# Patient Record
Sex: FEMALE | Race: BLACK OR AFRICAN AMERICAN | Employment: PART TIME | URBAN - METROPOLITAN AREA
[De-identification: names, ages, dates, MRNs, and addresses within clinical notes are randomized per-mention and may not be internally consistent; named-entity substitution may affect disease eponyms.]

---

## 2017-01-02 ENCOUNTER — HOSPITAL ENCOUNTER (EMERGENCY)
Age: 52
Discharge: HOME | End: 2017-01-02
Attending: EMERGENCY MEDICINE

## 2017-01-02 VITALS
OXYGEN SATURATION: 95 % | BODY MASS INDEX: 30.21 KG/M2 | HEIGHT: 61 IN | RESPIRATION RATE: 22 BRPM | HEART RATE: 110 BPM | TEMPERATURE: 99 F | SYSTOLIC BLOOD PRESSURE: 174 MMHG | WEIGHT: 160 LBS | DIASTOLIC BLOOD PRESSURE: 103 MMHG

## 2017-01-02 DIAGNOSIS — J20.9 ACUTE BRONCHITIS, UNSPECIFIED ORGANISM: Primary | ICD-10-CM

## 2017-01-02 PROCEDURE — 99283 EMERGENCY DEPT VISIT LOW MDM: CPT

## 2017-01-02 RX ORDER — AZITHROMYCIN 250 MG/1
TABLET, FILM COATED ORAL
Qty: 1 PACKAGE | Refills: 0 | Status: SHIPPED | OUTPATIENT
Start: 2017-01-02 | End: 2017-01-07

## 2017-01-02 RX ORDER — PREDNISONE 20 MG/1
40 TABLET ORAL DAILY
Qty: 10 TABLET | Refills: 0 | Status: SHIPPED | OUTPATIENT
Start: 2017-01-02 | End: 2017-01-07

## 2017-01-02 NOTE — ED PROVIDER NOTES
Patient Seen in: [address] [facility] In [place]    History   Patient presents with:  Cough/URI    Stated Complaint: cough/congestion    HPI    Patient presents with cough and congestion.   The patient states that the symptoms worsened on Saturd Surgeon: Shilo Vaughn MD;  [facility] [address] [facility] NDL/CATH SPI DX/THER Roxie Weztel  11/11/2015    Comment Procedure: ;  Surgeon: Shilo Vaughn MD;  Location: [address] [place] [address] BY [facility] [address] [facility] 59  N are as noted in HPI. Constitutional and vital signs reviewed. All other systems reviewed and negative except as noted above. PSFH elements reviewed from today and agreed except as otherwise stated in HPI.     Physical Exam     ED Triage Vitals   BP R-0

## 2017-01-03 ENCOUNTER — TELEPHONE (OUTPATIENT)
Dept: FAMILY MEDICINE CLINIC | Facility: CLINIC | Age: 52
End: 2017-01-03

## 2017-01-23 PROCEDURE — 86160 COMPLEMENT ANTIGEN: CPT | Performed by: INTERNAL MEDICINE

## 2017-02-14 ENCOUNTER — OFFICE VISIT (OUTPATIENT)
Dept: FAMILY MEDICINE CLINIC | Facility: CLINIC | Age: 52
End: 2017-02-14

## 2017-02-14 VITALS
HEIGHT: 61 IN | HEART RATE: 85 BPM | BODY MASS INDEX: 31.53 KG/M2 | OXYGEN SATURATION: 97 % | RESPIRATION RATE: 18 BRPM | WEIGHT: 167 LBS | TEMPERATURE: 99 F | DIASTOLIC BLOOD PRESSURE: 84 MMHG | SYSTOLIC BLOOD PRESSURE: 138 MMHG

## 2017-02-14 DIAGNOSIS — Z13.820 SCREENING FOR OSTEOPOROSIS: ICD-10-CM

## 2017-02-14 DIAGNOSIS — Z00.00 ROUTINE GENERAL MEDICAL EXAMINATION AT A HEALTH CARE FACILITY: Primary | ICD-10-CM

## 2017-02-14 DIAGNOSIS — Z00.00 BLOOD TESTS FOR ROUTINE GENERAL PHYSICAL EXAMINATION: ICD-10-CM

## 2017-02-14 DIAGNOSIS — I10 ESSENTIAL HYPERTENSION, BENIGN: ICD-10-CM

## 2017-02-14 DIAGNOSIS — L30.9 DERMATITIS: ICD-10-CM

## 2017-02-14 DIAGNOSIS — E55.9 VITAMIN D DEFICIENCY: ICD-10-CM

## 2017-02-14 DIAGNOSIS — Z12.39 SCREENING FOR BREAST CANCER: ICD-10-CM

## 2017-02-14 DIAGNOSIS — M32.9 SYSTEMIC LUPUS ERYTHEMATOSUS, UNSPECIFIED SLE TYPE, UNSPECIFIED ORGAN INVOLVEMENT STATUS (HCC): ICD-10-CM

## 2017-02-14 DIAGNOSIS — Z78.0 POSTMENOPAUSAL: ICD-10-CM

## 2017-02-14 PROCEDURE — 99396 PREV VISIT EST AGE 40-64: CPT | Performed by: FAMILY MEDICINE

## 2017-02-14 RX ORDER — CLOBETASOL PROPIONATE 0.46 MG/ML
5 SOLUTION TOPICAL 2 TIMES DAILY
Qty: 50 ML | Refills: 1 | Status: SHIPPED | OUTPATIENT
Start: 2017-02-14 | End: 2017-10-25

## 2017-02-14 RX ORDER — KETOCONAZOLE 20 MG/ML
5 SHAMPOO TOPICAL
Qty: 120 ML | Refills: 3 | Status: SHIPPED | OUTPATIENT
Start: 2017-02-16 | End: 2019-01-28

## 2017-02-14 NOTE — PROGRESS NOTES
HPI:  Here for a physical.    PAST MEDICAL HISTORY:  Past Medical History   Diagnosis Date   • ANEMIA    • HEADACHES    • HYPERTENSION    • OTHER DISEASES      lupus   • Brachial neuritis or radiculitis NOS    • Other malaise and fatigue    • Migraine, uns Outpatient Prescriptions:  [START ON 2/16/2017] Ketoconazole 2 % External Shampoo Apply 5 mL topically twice a week. Disp: 120 mL Rfl: 3   Clobetasol Propionate 0.05 % External Solution Apply 5 mL topically 2 (two) times daily.  Disp: 50 mL Rfl: 1   Labetal History Narrative       REVIEW OF SYSTEMS:  GENERAL: Feeling generally achy. Has pain behind the right shoulder radiating into the arm. Has pain in the right hip radiating down the leg. Will be seeing a neurologist on the 27th.   This is recommended by SHANTELL inflammation. Bilateral tympanic membranes pearly white. No effusions noted. Hearing grossly normal.  EYES: PERRLA, EOMI, bilateral sclera anicteric, non-injected. Conjunctiva pink. NOSE: Mucosa appears healthy. OROPHARYNX: Mucosa moist without lesions. monitor. Lupus followed by rheumatology.   Scalp dermatitis: Refilled shampoo  Eczema of the hands, refilled cream      Patient understood above plan and agreed to do labs within the next 30 days as well as to make all appointments for referrals if given w

## 2017-02-14 NOTE — PATIENT INSTRUCTIONS
Fast 8 hours for labs. Water, black coffee, or plain tea only. Any sugar in the system will alter the glucose level and triglycerides.

## 2017-02-27 PROBLEM — M48.061 LUMBAR SPINAL STENOSIS: Status: ACTIVE | Noted: 2017-02-27

## 2017-03-11 LAB
CHOL/HDLC RATIO: 2.5 (CALC)
CHOLESTEROL, TOTAL: 204 MG/DL (ref 125–200)
HDL CHOLESTEROL: 82 MG/DL
LDL-CHOLESTEROL: 100 MG/DL (CALC)
NON-HDL CHOLESTEROL: 122 MG/DL (CALC)
TRIGLYCERIDES: 108 MG/DL
VITAMIN D, 25-OH, TOTAL: 19 NG/ML (ref 30–100)

## 2017-03-12 RX ORDER — ERGOCALCIFEROL 1.25 MG/1
50000 CAPSULE ORAL
Qty: 12 CAPSULE | Refills: 0 | Status: SHIPPED | OUTPATIENT
Start: 2017-03-12 | End: 2017-03-16 | Stop reason: ALTCHOICE

## 2017-03-16 ENCOUNTER — OFFICE VISIT (OUTPATIENT)
Dept: FAMILY MEDICINE CLINIC | Facility: CLINIC | Age: 52
End: 2017-03-16

## 2017-03-16 VITALS
TEMPERATURE: 98 F | SYSTOLIC BLOOD PRESSURE: 124 MMHG | HEART RATE: 95 BPM | RESPIRATION RATE: 18 BRPM | OXYGEN SATURATION: 97 % | BODY MASS INDEX: 32 KG/M2 | DIASTOLIC BLOOD PRESSURE: 86 MMHG | WEIGHT: 168.81 LBS

## 2017-03-16 DIAGNOSIS — K59.00 CONSTIPATION, UNSPECIFIED CONSTIPATION TYPE: ICD-10-CM

## 2017-03-16 DIAGNOSIS — R10.84 DIFFUSE ABDOMINAL PAIN: Primary | ICD-10-CM

## 2017-03-16 DIAGNOSIS — R19.8 CHANGE IN BOWEL MOVEMENT: ICD-10-CM

## 2017-03-16 DIAGNOSIS — K92.1 MELENA: ICD-10-CM

## 2017-03-16 PROCEDURE — 99213 OFFICE O/P EST LOW 20 MIN: CPT | Performed by: FAMILY MEDICINE

## 2017-03-16 RX ORDER — POLYETHYLENE GLYCOL 3350 17 G/17G
17 POWDER, FOR SOLUTION ORAL DAILY
Qty: 10 EACH | Refills: 0 | COMMUNITY
Start: 2017-03-16 | End: 2020-10-29 | Stop reason: ALTCHOICE

## 2017-03-16 NOTE — PROGRESS NOTES
Here with 1 week of decreased bowel movements. The bowel movements have gone from being her normal shape to more small balls. She is noticed blood in the stool. No blood when wiping. She has abdominal pain worse in the epigastrium.   She is on a new med INJECTION, W/WO CONTRAST, DX/THERAPEUTIC SUBSTANCE, EPIDURAL/SUBARACHNOID; CERVICAL/THORACIC N/A 11/11/2015    Comment Procedure: CERVICAL EPIDURAL;  Surgeon: Litzy Soto MD;  Location: [address] [place] [address] NDL/CATH SPI Temp(Src) 98.3 °F (36.8 °C) (Oral)  Resp 18  Wt 168 lb 12.8 oz  SpO2 97%    Alert no acute distress. Back no CVA tenderness. Abdomen diffusely tender no rebound or guarding. Unable to palpate for masses. No inguinal lymphadenopathy.   Visual inspection

## 2017-03-23 ENCOUNTER — TELEPHONE (OUTPATIENT)
Dept: FAMILY MEDICINE CLINIC | Facility: CLINIC | Age: 52
End: 2017-03-23

## 2017-03-23 NOTE — TELEPHONE ENCOUNTER
Radiology asking if you would like IV contrast. Pt scheduled for CT abd pelvis tomorrow.   usually done with iv contrast

## 2017-04-20 RX ORDER — MOMETASONE FUROATE 1 MG/ML
SOLUTION TOPICAL
Qty: 60 ML | Refills: 1 | Status: SHIPPED | OUTPATIENT
Start: 2017-04-20 | End: 2020-04-27

## 2017-04-24 RX ORDER — ONDANSETRON 8 MG/1
TABLET, ORALLY DISINTEGRATING ORAL
Qty: 15 TABLET | Refills: 0 | OUTPATIENT
Start: 2017-04-24

## 2017-04-26 RX ORDER — ONDANSETRON 8 MG/1
TABLET, ORALLY DISINTEGRATING ORAL
Qty: 15 TABLET | Refills: 0 | Status: SHIPPED | OUTPATIENT
Start: 2017-04-26 | End: 2017-05-31

## 2017-04-27 RX ORDER — ONDANSETRON 8 MG/1
TABLET, ORALLY DISINTEGRATING ORAL
Qty: 15 TABLET | Refills: 0 | OUTPATIENT
Start: 2017-04-27

## 2017-05-03 ENCOUNTER — HOSPITAL (OUTPATIENT)
Dept: OTHER | Age: 52
End: 2017-05-03
Attending: ORTHOPAEDIC SURGERY

## 2017-05-03 LAB
ANALYZER ANC (IANC): ABNORMAL
ERYTHROCYTE [DISTWIDTH] IN BLOOD: 13.9 % (ref 11–15)
HEMATOCRIT: 35.4 % (ref 36–46.5)
HGB BLD-MCNC: 11.2 GM/DL (ref 12–15.5)
INR PPP: 1.1
MCH RBC QN AUTO: 23.1 PG (ref 26–34)
MCHC RBC AUTO-ENTMCNC: 31.6 GM/DL (ref 32–36.5)
MCV RBC AUTO: 73 FL (ref 78–100)
PLATELET # BLD: 255 THOUSAND/MCL (ref 140–450)
PROTHROMBIN TIME: 12 SECONDS (ref 9.7–11.8)
PROTHROMBIN TIME: ABNORMAL
RBC # BLD: 4.85 MILLION/MCL (ref 4–5.2)
WBC # BLD: 5.1 THOUSAND/MCL (ref 4.2–11)

## 2017-05-10 ENCOUNTER — TELEPHONE (OUTPATIENT)
Dept: FAMILY MEDICINE CLINIC | Facility: CLINIC | Age: 52
End: 2017-05-10

## 2017-05-12 ENCOUNTER — MED REC SCAN ONLY (OUTPATIENT)
Dept: FAMILY MEDICINE CLINIC | Facility: CLINIC | Age: 52
End: 2017-05-12

## 2017-05-31 PROCEDURE — 86038 ANTINUCLEAR ANTIBODIES: CPT | Performed by: INTERNAL MEDICINE

## 2017-05-31 PROCEDURE — 86160 COMPLEMENT ANTIGEN: CPT | Performed by: INTERNAL MEDICINE

## 2017-06-14 ENCOUNTER — PATIENT MESSAGE (OUTPATIENT)
Dept: FAMILY MEDICINE CLINIC | Facility: CLINIC | Age: 52
End: 2017-06-14

## 2017-06-15 RX ORDER — BETAMETHASONE DIPROPIONATE 0.5 MG/G
1 CREAM TOPICAL 2 TIMES DAILY
Qty: 1 TUBE | Refills: 0 | Status: SHIPPED | OUTPATIENT
Start: 2017-06-15 | End: 2021-06-17 | Stop reason: ALTCHOICE

## 2017-06-15 NOTE — TELEPHONE ENCOUNTER
Betamethasone DO 0.05% cream   Please see messages below, okay to refill this medication or would you like pt to go back to Derm?

## 2017-06-15 NOTE — TELEPHONE ENCOUNTER
From: Estiven Avelar  To: Joshua Ennis MD  Sent: 6/14/2017 10:25 AM CDT  Subject: Prescription Question    Dr. Whitlock,    I have used Betamethasone DO 0.05% cream prescribed by Dr. Astrid Goldstein.  I use it sparingly (prescribed in 2015) and have deplete

## 2017-09-01 ENCOUNTER — OFFICE VISIT (OUTPATIENT)
Dept: FAMILY MEDICINE CLINIC | Facility: CLINIC | Age: 52
End: 2017-09-01

## 2017-09-01 VITALS
OXYGEN SATURATION: 98 % | TEMPERATURE: 98 F | WEIGHT: 166 LBS | BODY MASS INDEX: 31.34 KG/M2 | HEART RATE: 99 BPM | DIASTOLIC BLOOD PRESSURE: 100 MMHG | RESPIRATION RATE: 18 BRPM | HEIGHT: 61 IN | SYSTOLIC BLOOD PRESSURE: 160 MMHG

## 2017-09-01 DIAGNOSIS — I10 ESSENTIAL HYPERTENSION, BENIGN: Primary | ICD-10-CM

## 2017-09-01 DIAGNOSIS — M32.9 SYSTEMIC LUPUS ERYTHEMATOSUS, UNSPECIFIED SLE TYPE, UNSPECIFIED ORGAN INVOLVEMENT STATUS (HCC): ICD-10-CM

## 2017-09-01 PROCEDURE — 99213 OFFICE O/P EST LOW 20 MIN: CPT | Performed by: FAMILY MEDICINE

## 2017-09-01 RX ORDER — AMILORIDE HYDROCHLORIDE 5 MG/1
10 TABLET ORAL DAILY
Qty: 180 TABLET | Refills: 0 | COMMUNITY
Start: 2017-09-01 | End: 2017-10-30

## 2017-09-01 RX ORDER — NAPROXEN 500 MG/1
500 TABLET ORAL 2 TIMES DAILY
Qty: 180 TABLET | Refills: 0 | COMMUNITY
Start: 2017-09-01 | End: 2017-12-03

## 2017-09-01 NOTE — PROGRESS NOTES
Here with elevated blood pressures over the last 3 weeks. She has been on prednisone just recently. She has 2 more days left. This is from her rheumatologist to try to help control the pain from her lupus.   She has been off of naproxen because she knew PAIN MANAGEMENT  11/11/2015: ALESSANDRA-TACO BY MED PATIÑO SVC 5+ YR      Comment: Procedure: ;  Surgeon: Argenis Bejarano MD;                 Location: [address] [place] [address] date: OTHER      Comment: ovarian cystectomy  8077: [address] Hypertension Mother    • Other [OTHER] Mother        PHYSICAL EXAM:  /100   Pulse 99   Temp 98 °F (36.7 °C) (Oral)   Resp 18   Ht 61\"   Wt 166 lb   SpO2 98%   BMI 31.37 kg/m²   Alert no acute distress. Neck no bruit.   Heart regular rhythm normal S1

## 2017-09-08 DIAGNOSIS — L30.9 DERMATITIS: ICD-10-CM

## 2017-09-08 RX ORDER — KETOCONAZOLE 20 MG/ML
5 SHAMPOO TOPICAL
Qty: 120 ML | Refills: 3 | Status: SHIPPED | OUTPATIENT
Start: 2017-09-11 | End: 2018-03-20

## 2017-10-23 DIAGNOSIS — L30.9 DERMATITIS: ICD-10-CM

## 2017-10-25 RX ORDER — CLOBETASOL PROPIONATE 0.46 MG/ML
5 SOLUTION TOPICAL 2 TIMES DAILY
Qty: 50 ML | Refills: 3 | Status: SHIPPED | OUTPATIENT
Start: 2017-10-25 | End: 2019-07-08

## 2017-10-28 DIAGNOSIS — I10 ESSENTIAL HYPERTENSION, BENIGN: ICD-10-CM

## 2017-10-30 RX ORDER — AMILORIDE HYDROCHLORIDE 5 MG/1
TABLET ORAL
Qty: 90 TABLET | Refills: 3 | Status: SHIPPED | OUTPATIENT
Start: 2017-10-30 | End: 2018-10-15

## 2017-11-27 DIAGNOSIS — I10 ESSENTIAL HYPERTENSION, BENIGN: ICD-10-CM

## 2017-11-27 RX ORDER — AMILORIDE HYDROCHLORIDE 5 MG/1
TABLET ORAL
Qty: 90 TABLET | Refills: 3 | OUTPATIENT
Start: 2017-11-27

## 2017-12-02 ENCOUNTER — APPOINTMENT (OUTPATIENT)
Dept: CT IMAGING | Facility: HOSPITAL | Age: 52
End: 2017-12-02
Attending: EMERGENCY MEDICINE
Payer: COMMERCIAL

## 2017-12-02 ENCOUNTER — HOSPITAL ENCOUNTER (OUTPATIENT)
Facility: HOSPITAL | Age: 52
Setting detail: OBSERVATION
Discharge: HOME | End: 2017-12-03
Attending: EMERGENCY MEDICINE | Admitting: HOSPITALIST
Payer: COMMERCIAL

## 2017-12-02 ENCOUNTER — APPOINTMENT (OUTPATIENT)
Dept: GENERAL RADIOLOGY | Facility: HOSPITAL | Age: 52
End: 2017-12-02
Attending: EMERGENCY MEDICINE
Payer: COMMERCIAL

## 2017-12-02 DIAGNOSIS — G43.901 MIGRAINE WITH STATUS MIGRAINOSUS, NOT INTRACTABLE, UNSPECIFIED MIGRAINE TYPE: Primary | ICD-10-CM

## 2017-12-02 DIAGNOSIS — I10 HYPERTENSION, UNSPECIFIED TYPE: ICD-10-CM

## 2017-12-02 DIAGNOSIS — R11.2 INTRACTABLE VOMITING WITH NAUSEA, UNSPECIFIED VOMITING TYPE: ICD-10-CM

## 2017-12-02 PROCEDURE — 99220 INITIAL OBSERVATION CARE,LEVL III: CPT | Performed by: HOSPITALIST

## 2017-12-02 PROCEDURE — 71010 XR CHEST AP PORTABLE  (CPT=71010): CPT | Performed by: EMERGENCY MEDICINE

## 2017-12-02 PROCEDURE — 70498 CT ANGIOGRAPHY NECK: CPT | Performed by: EMERGENCY MEDICINE

## 2017-12-02 PROCEDURE — 70496 CT ANGIOGRAPHY HEAD: CPT | Performed by: EMERGENCY MEDICINE

## 2017-12-02 PROCEDURE — 70450 CT HEAD/BRAIN W/O DYE: CPT | Performed by: EMERGENCY MEDICINE

## 2017-12-02 RX ORDER — ENALAPRILAT 2.5 MG/2ML
1.25 INJECTION INTRAVENOUS EVERY 4 HOURS PRN
Status: DISCONTINUED | OUTPATIENT
Start: 2017-12-02 | End: 2017-12-02

## 2017-12-02 RX ORDER — METOCLOPRAMIDE HYDROCHLORIDE 5 MG/ML
10 INJECTION INTRAMUSCULAR; INTRAVENOUS ONCE
Status: COMPLETED | OUTPATIENT
Start: 2017-12-02 | End: 2017-12-02

## 2017-12-02 RX ORDER — HYDROXYCHLOROQUINE SULFATE 200 MG/1
200 TABLET, FILM COATED ORAL 2 TIMES DAILY
Status: DISCONTINUED | OUTPATIENT
Start: 2017-12-02 | End: 2017-12-03

## 2017-12-02 RX ORDER — TEMAZEPAM 7.5 MG/1
7.5 CAPSULE ORAL NIGHTLY PRN
Status: DISCONTINUED | OUTPATIENT
Start: 2017-12-02 | End: 2017-12-03

## 2017-12-02 RX ORDER — BUTALBITAL, ACETAMINOPHEN AND CAFFEINE 50; 325; 40 MG/1; MG/1; MG/1
1-2 TABLET ORAL EVERY 4 HOURS PRN
Qty: 20 TABLET | Refills: 0 | Status: SHIPPED | OUTPATIENT
Start: 2017-12-02 | End: 2017-12-03

## 2017-12-02 RX ORDER — SUMATRIPTAN 6 MG/.5ML
6 INJECTION, SOLUTION SUBCUTANEOUS ONCE
Status: DISCONTINUED | OUTPATIENT
Start: 2017-12-02 | End: 2017-12-03

## 2017-12-02 RX ORDER — SUMATRIPTAN 6 MG/.5ML
6 INJECTION, SOLUTION SUBCUTANEOUS DAILY PRN
Status: DISCONTINUED | OUTPATIENT
Start: 2017-12-03 | End: 2017-12-03

## 2017-12-02 RX ORDER — ONDANSETRON 2 MG/ML
8 INJECTION INTRAMUSCULAR; INTRAVENOUS EVERY 6 HOURS PRN
Status: DISCONTINUED | OUTPATIENT
Start: 2017-12-02 | End: 2017-12-03

## 2017-12-02 RX ORDER — GABAPENTIN 300 MG/1
300 CAPSULE ORAL 4 TIMES DAILY
COMMUNITY
End: 2020-10-29

## 2017-12-02 RX ORDER — GABAPENTIN 300 MG/1
300 CAPSULE ORAL 4 TIMES DAILY
Status: DISCONTINUED | OUTPATIENT
Start: 2017-12-02 | End: 2017-12-03

## 2017-12-02 RX ORDER — LABETALOL 100 MG/1
100 TABLET, FILM COATED ORAL EVERY 12 HOURS SCHEDULED
Status: DISCONTINUED | OUTPATIENT
Start: 2017-12-02 | End: 2017-12-03

## 2017-12-02 RX ORDER — METOCLOPRAMIDE HYDROCHLORIDE 5 MG/ML
10 INJECTION INTRAMUSCULAR; INTRAVENOUS EVERY 8 HOURS PRN
Status: DISCONTINUED | OUTPATIENT
Start: 2017-12-02 | End: 2017-12-03

## 2017-12-02 RX ORDER — MORPHINE SULFATE 4 MG/ML
2 INJECTION, SOLUTION INTRAMUSCULAR; INTRAVENOUS EVERY 4 HOURS PRN
Status: DISCONTINUED | OUTPATIENT
Start: 2017-12-02 | End: 2017-12-03

## 2017-12-02 RX ORDER — ONDANSETRON 2 MG/ML
4 INJECTION INTRAMUSCULAR; INTRAVENOUS ONCE
Status: COMPLETED | OUTPATIENT
Start: 2017-12-02 | End: 2017-12-02

## 2017-12-02 RX ORDER — HYDROMORPHONE HYDROCHLORIDE 1 MG/ML
1 INJECTION, SOLUTION INTRAMUSCULAR; INTRAVENOUS; SUBCUTANEOUS ONCE
Status: COMPLETED | OUTPATIENT
Start: 2017-12-02 | End: 2017-12-02

## 2017-12-02 RX ORDER — KETOROLAC TROMETHAMINE 15 MG/ML
30 INJECTION, SOLUTION INTRAMUSCULAR; INTRAVENOUS EVERY 6 HOURS PRN
Status: DISCONTINUED | OUTPATIENT
Start: 2017-12-02 | End: 2017-12-03

## 2017-12-02 RX ORDER — BUTALBITAL, ACETAMINOPHEN AND CAFFEINE 50; 325; 40 MG/1; MG/1; MG/1
1 TABLET ORAL EVERY 4 HOURS PRN
Status: DISCONTINUED | OUTPATIENT
Start: 2017-12-02 | End: 2017-12-03

## 2017-12-02 RX ORDER — MORPHINE SULFATE 4 MG/ML
3 INJECTION, SOLUTION INTRAMUSCULAR; INTRAVENOUS EVERY 4 HOURS PRN
Status: DISCONTINUED | OUTPATIENT
Start: 2017-12-02 | End: 2017-12-03

## 2017-12-02 RX ORDER — ENALAPRILAT 2.5 MG/2ML
1.25 INJECTION INTRAVENOUS EVERY 4 HOURS PRN
Status: DISCONTINUED | OUTPATIENT
Start: 2017-12-02 | End: 2017-12-03

## 2017-12-02 RX ORDER — MORPHINE SULFATE 4 MG/ML
4 INJECTION, SOLUTION INTRAMUSCULAR; INTRAVENOUS EVERY 4 HOURS PRN
Status: DISCONTINUED | OUTPATIENT
Start: 2017-12-02 | End: 2017-12-03

## 2017-12-02 RX ORDER — DEXAMETHASONE SODIUM PHOSPHATE 4 MG/ML
10 VIAL (ML) INJECTION ONCE
Status: COMPLETED | OUTPATIENT
Start: 2017-12-02 | End: 2017-12-02

## 2017-12-02 RX ORDER — VENLAFAXINE 75 MG/1
75 TABLET ORAL DAILY
Status: DISCONTINUED | OUTPATIENT
Start: 2017-12-02 | End: 2017-12-03

## 2017-12-02 RX ORDER — HYDRALAZINE HYDROCHLORIDE 20 MG/ML
10 INJECTION INTRAMUSCULAR; INTRAVENOUS ONCE
Status: COMPLETED | OUTPATIENT
Start: 2017-12-02 | End: 2017-12-02

## 2017-12-02 RX ORDER — MORPHINE SULFATE 4 MG/ML
INJECTION, SOLUTION INTRAMUSCULAR; INTRAVENOUS EVERY 4 HOURS PRN
Status: DISCONTINUED | OUTPATIENT
Start: 2017-12-02 | End: 2017-12-02 | Stop reason: SDUPTHER

## 2017-12-02 RX ORDER — MORPHINE SULFATE 4 MG/ML
4 INJECTION, SOLUTION INTRAMUSCULAR; INTRAVENOUS ONCE
Status: COMPLETED | OUTPATIENT
Start: 2017-12-02 | End: 2017-12-02

## 2017-12-02 RX ORDER — AMILORIDE HYDROCHLORIDE 5 MG/1
10 TABLET ORAL DAILY
Status: DISCONTINUED | OUTPATIENT
Start: 2017-12-02 | End: 2017-12-03

## 2017-12-02 NOTE — PLAN OF CARE
PAIN - ADULT    • Verbalizes/displays adequate comfort level or patient's stated pain goal Progressing        Patient/Family Goals    • Patient/Family Long Term Goal Progressing    • Patient/Family Short Term Goal Progressing        Admitted pt from ED wit

## 2017-12-02 NOTE — H&P
[facility] HOSPITALIST  History and Physical     Tash Orona Patient Status:  Emergency    3/17/1965 MRN [id number]   Location [facility] Attending Christy Ashley MD   Hosp Day # 0 PCP Chapito Porter MD     Chief Complaint: head MANAGEMENT  10/14/2015: JAZZMINE BY  PHYS [facility] SVC 5+ YR N/A      Comment: Procedure: CERVICAL EPIDURAL;  Surgeon:                Shilo Vaughn MD;  Location: [place] [address] MANAGEMENT  11/11/2015: JAZZMINE BY  PHYS [address] [facility] 59  N SVC 5 Take 17 g by mouth daily. Disp: 10 each Rfl: 0   Ketoconazole 2 % External Shampoo Apply 5 mL topically twice a week. Disp: 120 mL Rfl: 3   Labetalol HCl 100 MG Oral Tab TAKE 1 TABLET (100 MG TOTAL) BY MOUTH 2 (TWO) TIMES DAILY.  Disp: 180 tablet Rfl: 3   S CREATSERUM  0.90   CA  9.7   ALB  3.9   NA  142   K  4.2   CL  108   CO2  25.0   ALKPHO  94   AST  27   ALT  30   BILT  0.4   TP  8.8*       Estimated Creatinine Clearance: 53.9 mL/min (based on SCr of 0.9 mg/dL).     Recent Labs   Lab  12/02/17   1022

## 2017-12-02 NOTE — ED PROVIDER NOTES
Patient Seen in: [facility] Emergency Department    History   Patient presents with:  Stroke (neurologic)    Stated Complaint: stroke    HPI    57-year-old female presents emergency department who started having a headache around 915 this morning. Tad Calzada MD;  Location: [place] [address] MANAGEMENT  10/14/2015: M-SEDAJ BY  PHYS [address] .S. [facility] 59  N [facility] 5+ YR N/A      Comment: Procedure: CERVICAL EPIDURAL;  Surgeon:                Tad Calzada MD;  Location: [address] [place] [address] hepatosplenomegaly bowel sounds are present , no pulsatile mass  Extremities: No clubbing cyanosis or edema 2+ distal pulses. Neuro: Cranial nerves II through XII intact with no gross focal sensory or motor abnormality.   Patient moving all extremities wel pain.    Ct Stroke Brain (no Iv)(cpt=70450)    Result Date: 12/2/2017  CONCLUSION:  1. Suggestion of mild periventricular and subcortical white matter disease. 2. No acute process is discretely identified.  If there is continued clinical concern, further as [address]               Medications Prescribed:  Current Discharge Medication List    START taking these medications    Butalbital-APAP-Caffeine -40 MG Oral Tab  Take 1-2 tablets by mouth every 4 (four) hours as needed for

## 2017-12-02 NOTE — PROGRESS NOTES
CODE STROKE NOTE:    Responded to code stroke in ED C2, paged at 10:28. Arrived to bedside at 830.    45year old female presents to ED with c/o nausea, emesis, \"worst HA of her life\" and difficulty speaking.   Per reports, patient woke up around 04:

## 2017-12-02 NOTE — ED INITIAL ASSESSMENT (HPI)
Pt comes in with HA that began around 0915 along with N/V. Pt has hx of migraines. On the way to ED pt's  noted slurred speech.

## 2017-12-03 VITALS
HEART RATE: 78 BPM | HEIGHT: 61 IN | RESPIRATION RATE: 18 BRPM | BODY MASS INDEX: 27.38 KG/M2 | OXYGEN SATURATION: 96 % | DIASTOLIC BLOOD PRESSURE: 88 MMHG | WEIGHT: 145 LBS | SYSTOLIC BLOOD PRESSURE: 138 MMHG | TEMPERATURE: 99 F

## 2017-12-03 PROCEDURE — 99217 OBSERVATION CARE DISCHARGE: CPT | Performed by: HOSPITALIST

## 2017-12-03 RX ORDER — METOCLOPRAMIDE 10 MG/1
10 TABLET ORAL 3 TIMES DAILY PRN
Qty: 20 TABLET | Refills: 0 | Status: SHIPPED | OUTPATIENT
Start: 2017-12-03 | End: 2018-03-28

## 2017-12-03 RX ORDER — SUMATRIPTAN 6 MG/.5ML
6 INJECTION, SOLUTION SUBCUTANEOUS ONCE
Status: COMPLETED | OUTPATIENT
Start: 2017-12-03 | End: 2017-12-03

## 2017-12-03 NOTE — PLAN OF CARE
Patient alert and oriented. NSR on tele. Room air. Patient complains of intermittent/frequent headaches. Plan to dc this afternoon if headaches are tolerable. Sub cut imitrix effectively briefly.  Patient complains of increased headaches and nausea when amb

## 2017-12-03 NOTE — PROGRESS NOTES
Feeling better but with some mild lingering headache. bp much better today; exam benign; Ok to d/c today if headache improves with imitrex.     Livier Viveros MD  [facility]  Internal Medicine Hospitalist  Pager [phone]

## 2017-12-03 NOTE — DISCHARGE SUMMARY
[facility] HOSPITALIST  DISCHARGE SUMMARY     Skip Simmons Patient Status:  Observation    3/17/1965 MRN [id number]   [facility] 7NE-A Attending Sally Edwards MD   Hosp Day # 0 PCP Aubrey Vogt MD     Date of Admission: 2017  Date o Prescription details   Metoclopramide HCl 10 MG Tabs  Commonly known as:  REGLAN      Take 1 tablet (10 mg total) by mouth 3 (three) times daily as needed (nausea or migraine).    Quantity:  20 tablet  Refills:  0        CONTINUE taking these medications (50 mg total) by mouth every 2 (two) hours as needed for Migraine.    Quantity:  10 tablet  Refills:  3        STOP taking these medications    multivitamin Tabs        naproxen 500 MG Tabs  Commonly known as:  NAPROSYN        Venlafaxine HCl 75 MG Tabs  Co

## 2017-12-04 NOTE — PAYOR COMM NOTE
--------------  ADMISSION REVIEW     [place] [facility] [place]:  [id number]  Authorization Number: [id number]    Admit date: N/A  Admit time: N/A       Admitting Physician: Hannah Patricio MD  Attending Physician:  No att. providers found  Primary Care PLATELET.   Procedure                               Abnormality         Status                     ---------                               -----------         ------                     CBC W/ DIFFERENTIAL[[id number]]          Abnormal            Final resul

## 2017-12-11 ENCOUNTER — TELEPHONE (OUTPATIENT)
Dept: FAMILY MEDICINE CLINIC | Facility: CLINIC | Age: 52
End: 2017-12-11

## 2017-12-11 NOTE — TELEPHONE ENCOUNTER
receiveded paperwork for FMLA, called pt and left vm letting her know she will need appt to have forms filled out, per discharge she was suppose to follow up with PCP, paperwork in Dr Darío hatfield.

## 2017-12-18 ENCOUNTER — TELEPHONE (OUTPATIENT)
Dept: FAMILY MEDICINE CLINIC | Facility: CLINIC | Age: 52
End: 2017-12-18

## 2017-12-18 ENCOUNTER — OFFICE VISIT (OUTPATIENT)
Dept: FAMILY MEDICINE CLINIC | Facility: CLINIC | Age: 52
End: 2017-12-18

## 2017-12-18 VITALS
TEMPERATURE: 98 F | WEIGHT: 164 LBS | HEART RATE: 82 BPM | RESPIRATION RATE: 18 BRPM | DIASTOLIC BLOOD PRESSURE: 84 MMHG | HEIGHT: 61 IN | SYSTOLIC BLOOD PRESSURE: 128 MMHG | OXYGEN SATURATION: 98 % | BODY MASS INDEX: 30.96 KG/M2

## 2017-12-18 DIAGNOSIS — R73.01 ELEVATED FASTING GLUCOSE: ICD-10-CM

## 2017-12-18 DIAGNOSIS — G43.001 MIGRAINE WITHOUT AURA AND WITH STATUS MIGRAINOSUS, NOT INTRACTABLE: Primary | ICD-10-CM

## 2017-12-18 PROCEDURE — 99214 OFFICE O/P EST MOD 30 MIN: CPT | Performed by: FAMILY MEDICINE

## 2017-12-18 RX ORDER — BUTALBITAL, ACETAMINOPHEN AND CAFFEINE 300; 40; 50 MG/1; MG/1; MG/1
1 CAPSULE ORAL EVERY 6 HOURS PRN
Qty: 30 CAPSULE | Refills: 0 | Status: SHIPPED | OUTPATIENT
Start: 2017-12-18 | End: 2018-01-01

## 2017-12-18 NOTE — TELEPHONE ENCOUNTER
A copy of Pt's [place] paper work has been faxed to [phone]. A copy has been sent to scan and an additional copy has been placed in the triage accordion file.

## 2017-12-18 NOTE — PROGRESS NOTES
HPI:    Patient ID: Armen Burciaga is a 45year old female. Pt has elevated glucose levels which are gradually increasing and uncontrolled. Migraine    This is a chronic problem. Episode onset: started Dec. 2nd, was hospitalised for a couple days. Rfl: 1   Polyethylene Glycol 3350 (MIRALAX) Oral Powd Pack Take 17 g by mouth daily. Disp: 10 each Rfl: 0   Ketoconazole 2 % External Shampoo Apply 5 mL topically twice a week.  Disp: 120 mL Rfl: 3   SUMAtriptan Succinate (IMITREX) 50 MG Oral Tab Take 1 tab depending on test or 3 months      Orders Placed This Encounter      CBC [6399] [Q]      HGB A1C [496] [Q]      COMP METABOLIC PANEL [30661] [Q]      MAGNESIUM [622][Q]    Meds This Visit:    Signed Prescriptions Disp Refills    Butalbital-APAP-Caffeine (F

## 2018-01-10 LAB
ABSOLUTE BASOPHILS: 22 CELLS/UL (ref 0–200)
ABSOLUTE EOSINOPHILS: 62 CELLS/UL (ref 15–500)
ABSOLUTE LYMPHOCYTES: 2050 CELLS/UL (ref 850–3900)
ABSOLUTE MONOCYTES: 504 CELLS/UL (ref 200–950)
ABSOLUTE NEUTROPHILS: 2962 CELLS/UL (ref 1500–7800)
ALBUMIN/GLOBULIN RATIO: 1.4 (CALC) (ref 1–2.5)
ALBUMIN: 4.4 G/DL (ref 3.6–5.1)
ALKALINE PHOSPHATASE: 80 U/L (ref 33–130)
ALT: 14 U/L (ref 6–29)
AST: 15 U/L (ref 10–35)
BASOPHILS: 0.4 %
BILIRUBIN, TOTAL: 0.3 MG/DL (ref 0.2–1.2)
BUN: 17 MG/DL (ref 7–25)
CALCIUM: 10 MG/DL (ref 8.6–10.4)
CARBON DIOXIDE: 27 MMOL/L (ref 20–31)
CHLORIDE: 106 MMOL/L (ref 98–110)
CREATININE: 0.82 MG/DL (ref 0.5–1.05)
EGFR IF AFRICN AM: 94 ML/MIN/1.73M2
EGFR IF NONAFRICN AM: 81 ML/MIN/1.73M2
EOSINOPHILS: 1.1 %
FERRITIN: 30 NG/ML (ref 10–232)
GLOBULIN: 3.2 G/DL (CALC) (ref 1.9–3.7)
GLUCOSE: 93 MG/DL (ref 65–99)
HEMATOCRIT: 36.5 % (ref 35–45)
HEMOGLOBIN A1C: 5.5 % OF TOTAL HGB
HEMOGLOBIN: 11.6 G/DL (ref 11.7–15.5)
LYMPHOCYTES: 36.6 %
MAGNESIUM: 2.1 MG/DL (ref 1.5–2.5)
MCH: 23.2 PG (ref 27–33)
MCHC: 31.8 G/DL (ref 32–36)
MCV: 73.1 FL (ref 80–100)
MONOCYTES: 9 %
MPV: 10.2 FL (ref 7.5–12.5)
NEUTROPHILS: 52.9 %
PLATELET COUNT: 284 THOUSAND/UL (ref 140–400)
POTASSIUM: 4 MMOL/L (ref 3.5–5.3)
PROTEIN, TOTAL: 7.6 G/DL (ref 6.1–8.1)
RDW: 14.1 % (ref 11–15)
RED BLOOD CELL COUNT: 4.99 MILLION/UL (ref 3.8–5.1)
SODIUM: 141 MMOL/L (ref 135–146)
WHITE BLOOD CELL COUNT: 5.6 THOUSAND/UL (ref 3.8–10.8)

## 2018-02-27 PROCEDURE — 86235 NUCLEAR ANTIGEN ANTIBODY: CPT | Performed by: INTERNAL MEDICINE

## 2018-02-27 PROCEDURE — 83516 IMMUNOASSAY NONANTIBODY: CPT | Performed by: INTERNAL MEDICINE

## 2018-02-27 PROCEDURE — 86038 ANTINUCLEAR ANTIBODIES: CPT | Performed by: INTERNAL MEDICINE

## 2018-02-27 PROCEDURE — 84156 ASSAY OF PROTEIN URINE: CPT | Performed by: INTERNAL MEDICINE

## 2018-02-27 PROCEDURE — 82570 ASSAY OF URINE CREATININE: CPT | Performed by: INTERNAL MEDICINE

## 2018-02-27 PROCEDURE — 86225 DNA ANTIBODY NATIVE: CPT | Performed by: INTERNAL MEDICINE

## 2018-02-27 PROCEDURE — 86160 COMPLEMENT ANTIGEN: CPT | Performed by: INTERNAL MEDICINE

## 2018-02-27 PROCEDURE — 86200 CCP ANTIBODY: CPT | Performed by: INTERNAL MEDICINE

## 2018-03-27 ENCOUNTER — PATIENT MESSAGE (OUTPATIENT)
Dept: FAMILY MEDICINE CLINIC | Facility: CLINIC | Age: 53
End: 2018-03-27

## 2018-03-28 RX ORDER — BUTALBITAL, ACETAMINOPHEN AND CAFFEINE 50; 325; 40 MG/1; MG/1; MG/1
1-2 TABLET ORAL EVERY 4 HOURS PRN
Qty: 20 TABLET | Refills: 0 | Status: SHIPPED | OUTPATIENT
Start: 2018-03-28 | End: 2018-04-04

## 2018-03-28 RX ORDER — METOCLOPRAMIDE 10 MG/1
10 TABLET ORAL 3 TIMES DAILY PRN
Qty: 20 TABLET | Refills: 0 | Status: SHIPPED | OUTPATIENT
Start: 2018-03-28 | End: 2020-10-29 | Stop reason: ALTCHOICE

## 2018-03-28 NOTE — TELEPHONE ENCOUNTER
From: Christy Day  To: Bruce Recio MD  Sent: 3/27/2018 6:30 PM CDT  Subject: Prescription Question    Dr. Amie Asencio,    I would like to know if you can call in refills for the following medication for my migraines until I see a neurologist:    1.  But

## 2018-06-02 ENCOUNTER — OFFICE VISIT (OUTPATIENT)
Dept: FAMILY MEDICINE CLINIC | Facility: CLINIC | Age: 53
End: 2018-06-02

## 2018-06-02 VITALS
HEIGHT: 61 IN | TEMPERATURE: 98 F | SYSTOLIC BLOOD PRESSURE: 126 MMHG | HEART RATE: 92 BPM | WEIGHT: 159 LBS | RESPIRATION RATE: 14 BRPM | BODY MASS INDEX: 30.02 KG/M2 | DIASTOLIC BLOOD PRESSURE: 74 MMHG

## 2018-06-02 DIAGNOSIS — R23.4 FISSURE IN SKIN: Primary | ICD-10-CM

## 2018-06-02 DIAGNOSIS — L30.8 OTHER ECZEMA: ICD-10-CM

## 2018-06-02 PROCEDURE — 99213 OFFICE O/P EST LOW 20 MIN: CPT | Performed by: FAMILY MEDICINE

## 2018-06-02 RX ORDER — MUPIROCIN CALCIUM 20 MG/G
1 CREAM TOPICAL 3 TIMES DAILY
Qty: 30 G | Refills: 0 | Status: SHIPPED | OUTPATIENT
Start: 2018-06-02 | End: 2019-04-29

## 2018-06-02 RX ORDER — DOXYCYCLINE HYCLATE 50 MG/1
1 TABLET, FILM COATED ORAL 2 TIMES DAILY
Qty: 20 TABLET | Refills: 0 | Status: SHIPPED | OUTPATIENT
Start: 2018-06-02 | End: 2018-06-11 | Stop reason: ALTCHOICE

## 2018-06-02 NOTE — PROGRESS NOTES
For over a year has had fissures on the lateral aspect of the nose. Also developed something behind the left ear. She had something similar on her backside was able to heal this with Desitin.   When she washes her face the fissures will break open and sca Shilo Vaughn MD;  Location: [place] [address] MANAGEMENT  11/11/2015: JAZZMINE BY  DESHAUN PATIÑO [facility] 5+ YR      Comment: Procedure: ;  Surgeon: Shilo Vaughn MD;                 Location: [facility] FOR PAIN MANAGEMENT  No date: TIMES DAILY. Disp: 60 mL Rfl: 1   Polyethylene Glycol 3350 (MIRALAX) Oral Powd Pack Take 17 g by mouth daily. Disp: 10 each Rfl: 0   Ketoconazole 2 % External Shampoo Apply 5 mL topically twice a week.  Disp: 120 mL Rfl: 3   SUMAtriptan Succinate (IMITREX)

## 2018-06-11 RX ORDER — LABETALOL 100 MG/1
TABLET, FILM COATED ORAL
Qty: 180 TABLET | Refills: 2 | Status: SHIPPED | OUTPATIENT
Start: 2018-06-11 | End: 2019-10-22

## 2018-08-15 ENCOUNTER — CHARTING TRANS (OUTPATIENT)
Dept: OTHER | Age: 53
End: 2018-08-15

## 2018-08-27 ENCOUNTER — TELEPHONE (OUTPATIENT)
Dept: FAMILY MEDICINE CLINIC | Facility: CLINIC | Age: 53
End: 2018-08-27

## 2018-09-20 ENCOUNTER — OFFICE VISIT (OUTPATIENT)
Dept: FAMILY MEDICINE CLINIC | Facility: CLINIC | Age: 53
End: 2018-09-20
Payer: COMMERCIAL

## 2018-09-20 VITALS
TEMPERATURE: 99 F | HEIGHT: 61 IN | SYSTOLIC BLOOD PRESSURE: 136 MMHG | OXYGEN SATURATION: 99 % | DIASTOLIC BLOOD PRESSURE: 82 MMHG | WEIGHT: 163 LBS | BODY MASS INDEX: 30.78 KG/M2 | HEART RATE: 80 BPM

## 2018-09-20 DIAGNOSIS — S39.012A BACK STRAIN, INITIAL ENCOUNTER: Primary | ICD-10-CM

## 2018-09-20 PROCEDURE — 99213 OFFICE O/P EST LOW 20 MIN: CPT | Performed by: FAMILY MEDICINE

## 2018-09-20 RX ORDER — PREDNISONE 20 MG/1
40 TABLET ORAL DAILY
Qty: 10 TABLET | Refills: 0 | Status: SHIPPED | OUTPATIENT
Start: 2018-09-20 | End: 2019-04-09

## 2018-09-20 NOTE — PROGRESS NOTES
Patient with history of rheumatoid arthritis was working in her basement cleaning it out last weekend. Has developed bilateral lower back pain. No radiation down the legs. Denies numbness weakness or tingling. No incontinence of urine or stool.   No hem Location: [address] [place] [address] [place] MANAGEMENT  11/11/2015: INJECTION, W/WO CONTRAST, DX/THERAPEUTIC SUBSTANCE,   EPIDURAL/SUBARACHNOID; CERVICAL/THORACIC; N/A      Comment:  Procedure: CERVICAL EPIDURAL;  Surgeon: Tosha Santos MD;  Location:  Tube Rfl: 0   MOMETASONE FUROATE 0.1 % External Solution APPLY 1 APPLICATION TOPICALLY 2 (TWO) TIMES DAILY. Disp: 60 mL Rfl: 1   Ketoconazole 2 % External Shampoo Apply 5 mL topically twice a week.  Disp: 120 mL Rfl: 3   Butalbital-APAP-Caffeine 50-56-36 M

## 2018-10-15 DIAGNOSIS — I10 ESSENTIAL HYPERTENSION, BENIGN: ICD-10-CM

## 2018-10-16 RX ORDER — AMILORIDE HYDROCHLORIDE 5 MG/1
TABLET ORAL
Qty: 90 TABLET | Refills: 2 | Status: SHIPPED | OUTPATIENT
Start: 2018-10-16 | End: 2019-10-22

## 2018-12-27 RX ORDER — ALBUTEROL SULFATE 90 UG/1
2 AEROSOL, METERED RESPIRATORY (INHALATION) EVERY 4 HOURS PRN
Qty: 1 INHALER | Refills: 2 | Status: SHIPPED | OUTPATIENT
Start: 2018-12-27 | End: 2019-01-22 | Stop reason: ALTCHOICE

## 2019-01-22 ENCOUNTER — OFFICE VISIT (OUTPATIENT)
Dept: FAMILY MEDICINE CLINIC | Facility: CLINIC | Age: 54
End: 2019-01-22
Payer: COMMERCIAL

## 2019-01-22 VITALS
HEIGHT: 61 IN | BODY MASS INDEX: 30.21 KG/M2 | TEMPERATURE: 98 F | WEIGHT: 160 LBS | DIASTOLIC BLOOD PRESSURE: 86 MMHG | OXYGEN SATURATION: 98 % | HEART RATE: 107 BPM | RESPIRATION RATE: 18 BRPM | SYSTOLIC BLOOD PRESSURE: 136 MMHG

## 2019-01-22 DIAGNOSIS — R05.8 POST-VIRAL COUGH SYNDROME: ICD-10-CM

## 2019-01-22 DIAGNOSIS — I10 ESSENTIAL HYPERTENSION, BENIGN: ICD-10-CM

## 2019-01-22 DIAGNOSIS — M25.512 CHRONIC LEFT SHOULDER PAIN: Primary | ICD-10-CM

## 2019-01-22 DIAGNOSIS — G89.29 CHRONIC LEFT SHOULDER PAIN: Primary | ICD-10-CM

## 2019-01-22 PROBLEM — G43.901 MIGRAINE WITH STATUS MIGRAINOSUS, NOT INTRACTABLE, UNSPECIFIED MIGRAINE TYPE: Status: RESOLVED | Noted: 2017-12-02 | Resolved: 2019-01-22

## 2019-01-22 PROBLEM — R11.2 INTRACTABLE VOMITING WITH NAUSEA, UNSPECIFIED VOMITING TYPE: Status: RESOLVED | Noted: 2017-12-02 | Resolved: 2019-01-22

## 2019-01-22 PROBLEM — G43.901 STATUS MIGRAINOSUS: Status: RESOLVED | Noted: 2017-12-02 | Resolved: 2019-01-22

## 2019-01-22 PROCEDURE — 99214 OFFICE O/P EST MOD 30 MIN: CPT | Performed by: FAMILY MEDICINE

## 2019-01-22 RX ORDER — BENZONATATE 100 MG/1
100 CAPSULE ORAL 3 TIMES DAILY PRN
Qty: 30 CAPSULE | Refills: 0 | Status: SHIPPED | OUTPATIENT
Start: 2019-01-22 | End: 2019-05-13 | Stop reason: ALTCHOICE

## 2019-01-22 NOTE — PROGRESS NOTES
Here with 8 weeks of left shoulder pain. She is left-handed. She was working in her basement lifting lots of bags. Few days later noticed pain in the superior and anterior left shoulder.   On December 5 she saw Dr. Dev Day her rheumatologist who considered 100 MG Oral Cap Take 1 capsule (100 mg total) by mouth 3 (three) times daily as needed for cough.  Disp: 30 capsule Rfl: 0   BUTALBITAL-APAP-CAFFEINE -40 MG Oral Tab TAKE ONE TABLET BY MOUTH EVERY SIX HOURS AS NEEDED  Disp: 20 tablet Rfl: 0   AMILORID EXAM:  /86 (BP Location: Right arm, Patient Position: Sitting, Cuff Size: adult)   Pulse 107   Temp 98.1 °F (36.7 °C) (Oral)   Resp 18   Ht 61\"   Wt 160 lb   SpO2 98%   BMI 30.23 kg/m²     Alert no acute distress. Repeat blood pressure improved.   Luzmaria Dougherty

## 2019-01-28 ENCOUNTER — HOSPITAL ENCOUNTER (OUTPATIENT)
Dept: GENERAL RADIOLOGY | Age: 54
Discharge: HOME | End: 2019-01-28
Attending: FAMILY MEDICINE
Payer: COMMERCIAL

## 2019-01-28 DIAGNOSIS — G89.29 CHRONIC LEFT SHOULDER PAIN: ICD-10-CM

## 2019-01-28 DIAGNOSIS — M25.512 CHRONIC LEFT SHOULDER PAIN: ICD-10-CM

## 2019-01-28 DIAGNOSIS — L30.9 DERMATITIS: ICD-10-CM

## 2019-01-28 DIAGNOSIS — M25.512 CHRONIC LEFT SHOULDER PAIN: Primary | ICD-10-CM

## 2019-01-28 DIAGNOSIS — G89.29 CHRONIC LEFT SHOULDER PAIN: Primary | ICD-10-CM

## 2019-01-28 PROCEDURE — 73030 X-RAY EXAM OF SHOULDER: CPT | Performed by: FAMILY MEDICINE

## 2019-01-28 RX ORDER — KETOCONAZOLE 20 MG/ML
5 SHAMPOO TOPICAL
Qty: 120 ML | Refills: 3 | Status: SHIPPED | OUTPATIENT
Start: 2019-01-28 | End: 2020-07-12

## 2019-02-05 ENCOUNTER — HOSPITAL ENCOUNTER (OUTPATIENT)
Dept: MRI IMAGING | Facility: HOSPITAL | Age: 54
Discharge: HOME | End: 2019-02-05
Attending: FAMILY MEDICINE
Payer: COMMERCIAL

## 2019-02-05 DIAGNOSIS — G89.29 CHRONIC LEFT SHOULDER PAIN: ICD-10-CM

## 2019-02-05 DIAGNOSIS — M25.512 CHRONIC LEFT SHOULDER PAIN: ICD-10-CM

## 2019-02-05 PROCEDURE — 73221 MRI JOINT UPR EXTREM W/O DYE: CPT | Performed by: FAMILY MEDICINE

## 2019-02-06 PROBLEM — M67.80 TENDONOSIS: Status: ACTIVE | Noted: 2019-02-06

## 2019-02-06 PROBLEM — M19.012 OSTEOARTHRITIS OF LEFT SHOULDER: Status: ACTIVE | Noted: 2019-02-06

## 2019-03-09 ENCOUNTER — HOSPITAL ENCOUNTER (OUTPATIENT)
Dept: MRI IMAGING | Facility: HOSPITAL | Age: 54
Discharge: HOME | End: 2019-03-09
Attending: PHYSICIAN ASSISTANT
Payer: COMMERCIAL

## 2019-03-09 DIAGNOSIS — M19.012 PRIMARY OSTEOARTHRITIS OF LEFT SHOULDER: ICD-10-CM

## 2019-03-09 DIAGNOSIS — M48.062 SPINAL STENOSIS OF LUMBAR REGION WITH NEUROGENIC CLAUDICATION: ICD-10-CM

## 2019-03-09 DIAGNOSIS — M48.02 CERVICAL STENOSIS OF SPINAL CANAL: ICD-10-CM

## 2019-03-09 DIAGNOSIS — Z98.1 S/P CERVICAL SPINAL FUSION: ICD-10-CM

## 2019-03-09 DIAGNOSIS — M47.812 SPONDYLOSIS OF CERVICAL REGION WITHOUT MYELOPATHY OR RADICULOPATHY: ICD-10-CM

## 2019-03-09 PROCEDURE — A9575 INJ GADOTERATE MEGLUMI 0.1ML: HCPCS | Performed by: PHYSICIAN ASSISTANT

## 2019-03-09 PROCEDURE — 72156 MRI NECK SPINE W/O & W/DYE: CPT | Performed by: PHYSICIAN ASSISTANT

## 2019-03-09 PROCEDURE — 72148 MRI LUMBAR SPINE W/O DYE: CPT | Performed by: PHYSICIAN ASSISTANT

## 2019-04-09 DIAGNOSIS — S39.012A BACK STRAIN, INITIAL ENCOUNTER: ICD-10-CM

## 2019-04-10 RX ORDER — PREDNISONE 20 MG/1
TABLET ORAL
Qty: 10 TABLET | Refills: 0 | Status: SHIPPED | OUTPATIENT
Start: 2019-04-10 | End: 2019-07-30

## 2019-04-29 ENCOUNTER — TELEPHONE (OUTPATIENT)
Dept: FAMILY MEDICINE CLINIC | Facility: CLINIC | Age: 54
End: 2019-04-29

## 2019-04-29 DIAGNOSIS — R23.4 FISSURE IN SKIN: ICD-10-CM

## 2019-04-29 DIAGNOSIS — L30.8 OTHER ECZEMA: ICD-10-CM

## 2019-04-30 RX ORDER — MUPIROCIN CALCIUM 20 MG/G
CREAM TOPICAL
Qty: 30 G | Refills: 0 | Status: ON HOLD | OUTPATIENT
Start: 2019-04-30 | End: 2020-03-05

## 2019-05-02 NOTE — TELEPHONE ENCOUNTER
Per [address] [place] [address] pharmacy,  Bactroban cream unavailable, switching to ointment.

## 2019-05-13 ENCOUNTER — OFFICE VISIT (OUTPATIENT)
Dept: FAMILY MEDICINE CLINIC | Facility: CLINIC | Age: 54
End: 2019-05-13
Payer: COMMERCIAL

## 2019-05-13 VITALS
RESPIRATION RATE: 16 BRPM | HEART RATE: 82 BPM | HEIGHT: 61 IN | DIASTOLIC BLOOD PRESSURE: 105 MMHG | OXYGEN SATURATION: 98 % | SYSTOLIC BLOOD PRESSURE: 155 MMHG | WEIGHT: 169 LBS | TEMPERATURE: 98 F | BODY MASS INDEX: 31.91 KG/M2

## 2019-05-13 DIAGNOSIS — I10 HYPERTENSION, ACCELERATED: Primary | ICD-10-CM

## 2019-05-13 PROCEDURE — 99213 OFFICE O/P EST LOW 20 MIN: CPT | Performed by: FAMILY MEDICINE

## 2019-05-13 RX ORDER — TRAMADOL HYDROCHLORIDE 50 MG/1
50 TABLET ORAL EVERY 6 HOURS PRN
Qty: 40 TABLET | Refills: 1 | Status: SHIPPED | OUTPATIENT
Start: 2019-05-13 | End: 2020-02-07

## 2019-05-13 NOTE — PROGRESS NOTES
Here with her  within the past 2 hours received pain injection in her left posterior shoulder and they noticed at that facility that her blood pressure had come up.   She has a history of hypertension and her primary medicine is a milia ride and labe

## 2019-05-17 ENCOUNTER — OFFICE VISIT (OUTPATIENT)
Dept: FAMILY MEDICINE CLINIC | Facility: CLINIC | Age: 54
End: 2019-05-17
Payer: COMMERCIAL

## 2019-05-17 VITALS
DIASTOLIC BLOOD PRESSURE: 105 MMHG | RESPIRATION RATE: 16 BRPM | BODY MASS INDEX: 31.53 KG/M2 | TEMPERATURE: 98 F | OXYGEN SATURATION: 98 % | WEIGHT: 167 LBS | HEIGHT: 61 IN | SYSTOLIC BLOOD PRESSURE: 159 MMHG | HEART RATE: 76 BPM

## 2019-05-17 DIAGNOSIS — I10 ACCELERATED HYPERTENSION: Primary | ICD-10-CM

## 2019-05-17 PROCEDURE — 99213 OFFICE O/P EST LOW 20 MIN: CPT | Performed by: FAMILY MEDICINE

## 2019-05-17 RX ORDER — LOSARTAN POTASSIUM AND HYDROCHLOROTHIAZIDE 12.5; 1 MG/1; MG/1
1 TABLET ORAL DAILY
Qty: 90 TABLET | Refills: 3 | Status: SHIPPED | OUTPATIENT
Start: 2019-05-17 | End: 2020-09-10

## 2019-05-17 RX ORDER — AMLODIPINE BESYLATE 5 MG/1
5 TABLET ORAL DAILY
Qty: 90 TABLET | Refills: 3 | Status: SHIPPED | OUTPATIENT
Start: 2019-05-17 | End: 2020-05-12

## 2019-05-17 NOTE — PROGRESS NOTES
Here for follow-up of her blood pressure. She is tolerating her medicines quite well but does admit to having long-standing fatigue.   I noticed that somewhere along the line in her previous care here at the clinic or the hospital she had been placed on la

## 2019-07-08 DIAGNOSIS — L30.9 DERMATITIS: ICD-10-CM

## 2019-07-09 RX ORDER — CLOBETASOL PROPIONATE 0.46 MG/ML
5 SOLUTION TOPICAL 2 TIMES DAILY
Qty: 50 ML | Refills: 2 | Status: SHIPPED | OUTPATIENT
Start: 2019-07-09 | End: 2021-06-17 | Stop reason: ALTCHOICE

## 2019-07-31 DIAGNOSIS — M54.16 LUMBAR RADICULAR PAIN: ICD-10-CM

## 2019-07-31 DIAGNOSIS — IMO0002 CHRONIC MIGRAINE: ICD-10-CM

## 2019-07-31 DIAGNOSIS — M79.672 LEFT FOOT PAIN: ICD-10-CM

## 2019-07-31 DIAGNOSIS — G44.209 TENSION HEADACHE: ICD-10-CM

## 2019-07-31 RX ORDER — BUTALBITAL, ACETAMINOPHEN AND CAFFEINE 50; 325; 40 MG/1; MG/1; MG/1
TABLET ORAL
Qty: 20 TABLET | Refills: 0 | Status: ON HOLD | OUTPATIENT
Start: 2019-07-31 | End: 2020-03-08

## 2019-08-26 ENCOUNTER — TELEPHONE (OUTPATIENT)
Dept: FAMILY MEDICINE CLINIC | Facility: CLINIC | Age: 54
End: 2019-08-26

## 2019-08-26 DIAGNOSIS — S39.012A BACK STRAIN, INITIAL ENCOUNTER: ICD-10-CM

## 2019-08-27 RX ORDER — PREDNISONE 20 MG/1
TABLET ORAL
Qty: 10 TABLET | Refills: 0 | OUTPATIENT
Start: 2019-08-27

## 2019-08-27 NOTE — TELEPHONE ENCOUNTER
Is she looking for a refill of the 5 mg dose?   Where she having an acute exacerbation and needs of the 5-day boost?

## 2019-08-27 NOTE — TELEPHONE ENCOUNTER
Pt requesting Prednisone 20mg    Has taken Prednisone 5mg, last refill 7/30/19- given by Dr. Jacque Kidd    Please advise

## 2019-08-31 DIAGNOSIS — L30.8 OTHER ECZEMA: ICD-10-CM

## 2019-08-31 DIAGNOSIS — R23.4 FISSURE IN SKIN: ICD-10-CM

## 2019-08-31 RX ORDER — MUPIROCIN CALCIUM 20 MG/G
CREAM TOPICAL
Qty: 30 G | Refills: 0 | Status: ON HOLD | OUTPATIENT
Start: 2019-08-31 | End: 2020-03-05

## 2019-09-23 RX ORDER — ONDANSETRON 8 MG/1
TABLET, ORALLY DISINTEGRATING ORAL
Qty: 15 TABLET | Refills: 0 | Status: SHIPPED | OUTPATIENT
Start: 2019-09-23 | End: 2020-02-07

## 2019-10-22 PROBLEM — Z79.899 LONG-TERM USE OF HIGH-RISK MEDICATION: Status: ACTIVE | Noted: 2019-10-22

## 2019-12-02 ENCOUNTER — HOSPITAL ENCOUNTER (OUTPATIENT)
Age: 54
Discharge: HOME | End: 2019-12-02
Payer: COMMERCIAL

## 2019-12-02 ENCOUNTER — APPOINTMENT (OUTPATIENT)
Dept: GENERAL RADIOLOGY | Age: 54
End: 2019-12-02
Attending: PHYSICIAN ASSISTANT
Payer: COMMERCIAL

## 2019-12-02 VITALS
RESPIRATION RATE: 18 BRPM | HEART RATE: 92 BPM | TEMPERATURE: 98 F | SYSTOLIC BLOOD PRESSURE: 140 MMHG | DIASTOLIC BLOOD PRESSURE: 87 MMHG

## 2019-12-02 DIAGNOSIS — M25.572 ACUTE LEFT ANKLE PAIN: Primary | ICD-10-CM

## 2019-12-02 PROCEDURE — 99214 OFFICE O/P EST MOD 30 MIN: CPT

## 2019-12-02 PROCEDURE — 99213 OFFICE O/P EST LOW 20 MIN: CPT

## 2019-12-02 PROCEDURE — 73610 X-RAY EXAM OF ANKLE: CPT | Performed by: PHYSICIAN ASSISTANT

## 2019-12-02 RX ORDER — METHYLPREDNISOLONE 4 MG/1
TABLET ORAL
Qty: 1 PACKAGE | Refills: 0 | Status: SHIPPED | OUTPATIENT
Start: 2019-12-02 | End: 2020-01-29 | Stop reason: ALTCHOICE

## 2019-12-02 NOTE — ED PROVIDER NOTES
Patient Seen in: [facility]      History   Patient presents with:   Ankle Pain    Stated Complaint: left foot ankle pain x 4 days    HPI    Jami is a 49-year-old female presents today for evaluation of left ankle pain over the pas [address] [facility] [place] MANAGEMENT   • COLONOSCOPY  8/2009   • LUMBAR EPIDURAL N/A 5/13/2019    Performed by Luis Angel Ambriz MD at [address]   • OTHER      ovarian cystectomy   • OTHER SURGICAL HISTORY  2008    uterine ablation   • OTHER Ankle (min 3 Views), Left (cpt=73610)    Result Date: 12/2/2019  PROCEDURE:  XR ANKLE (MIN 3 VIEWS), LEFT (CPT=73610)  TECHNIQUE:  Three views were obtained. COMPARISON:  None.   INDICATIONS:  left foot ankle pain x 4 days  PATIENT STATED HISTORY: (As real medications    methylPREDNISolone 4 MG Oral Tablet Therapy Pack  Dispense as dose pack., Normal, Disp-1 Package, R-0

## 2020-01-29 ENCOUNTER — TELEPHONE (OUTPATIENT)
Dept: FAMILY MEDICINE CLINIC | Facility: CLINIC | Age: 55
End: 2020-01-29

## 2020-01-29 RX ORDER — HYDROCHLOROTHIAZIDE 12.5 MG/1
12.5 CAPSULE, GELATIN COATED ORAL DAILY
Qty: 90 CAPSULE | Refills: 0 | Status: SHIPPED | OUTPATIENT
Start: 2020-01-29 | End: 2020-05-12

## 2020-01-29 RX ORDER — LOSARTAN POTASSIUM 100 MG/1
100 TABLET ORAL DAILY
Qty: 90 TABLET | Refills: 0 | Status: SHIPPED | OUTPATIENT
Start: 2020-01-29 | End: 2020-05-12

## 2020-02-03 ENCOUNTER — OFFICE VISIT (OUTPATIENT)
Dept: FAMILY MEDICINE CLINIC | Facility: CLINIC | Age: 55
End: 2020-02-03
Payer: COMMERCIAL

## 2020-02-03 VITALS
BODY MASS INDEX: 29.83 KG/M2 | SYSTOLIC BLOOD PRESSURE: 130 MMHG | HEIGHT: 61 IN | TEMPERATURE: 98 F | DIASTOLIC BLOOD PRESSURE: 80 MMHG | WEIGHT: 158 LBS | RESPIRATION RATE: 18 BRPM | HEART RATE: 99 BPM

## 2020-02-03 DIAGNOSIS — J18.9 PNEUMONIA OF LEFT LOWER LOBE DUE TO INFECTIOUS ORGANISM: Primary | ICD-10-CM

## 2020-02-03 PROCEDURE — 99213 OFFICE O/P EST LOW 20 MIN: CPT | Performed by: FAMILY MEDICINE

## 2020-02-03 RX ORDER — BENZONATATE 100 MG/1
100 CAPSULE ORAL 3 TIMES DAILY PRN
Qty: 20 CAPSULE | Refills: 0 | Status: SHIPPED | OUTPATIENT
Start: 2020-02-03 | End: 2020-02-20 | Stop reason: ALTCHOICE

## 2020-02-03 RX ORDER — AZITHROMYCIN 250 MG/1
TABLET, FILM COATED ORAL
Qty: 6 TABLET | Refills: 0 | Status: SHIPPED | OUTPATIENT
Start: 2020-02-03 | End: 2020-02-20 | Stop reason: ALTCHOICE

## 2020-02-03 NOTE — PROGRESS NOTES
Here with cough going on less than a week. No head congestion. No sore throat. No ear pain or pressure. No headaches. She has a history of respiratory failure requiring tracheostomy. She is having surgery in March.   She has a follow-up visit February MG Oral Cap Take 1 capsule (100 mg total) by mouth 3 (three) times daily as needed for cough. 20 capsule 0   • losartan 100 MG Oral Tab Take 1 tablet (100 mg total) by mouth daily.  90 tablet 0   • hydrochlorothiazide 12.5 MG Oral Cap Take 1 capsule (12.5 m gabapentin 300 MG Oral Cap Take 300 mg by mouth 4 (four) times daily. • betamethasone dipropionate 0.05 % External Cream Apply 1 Application topically 2 (two) times daily.  1 Tube 0   • MOMETASONE FUROATE 0.1 % External Solution APPLY 1 APPLICATION TOPI

## 2020-02-05 ENCOUNTER — HOSPITAL ENCOUNTER (OUTPATIENT)
Dept: GENERAL RADIOLOGY | Age: 55
Discharge: HOME | End: 2020-02-05
Attending: FAMILY MEDICINE
Payer: COMMERCIAL

## 2020-02-05 ENCOUNTER — OFFICE VISIT (OUTPATIENT)
Dept: FAMILY MEDICINE CLINIC | Facility: CLINIC | Age: 55
End: 2020-02-05
Payer: COMMERCIAL

## 2020-02-05 VITALS
HEART RATE: 100 BPM | HEIGHT: 61 IN | TEMPERATURE: 98 F | SYSTOLIC BLOOD PRESSURE: 110 MMHG | DIASTOLIC BLOOD PRESSURE: 70 MMHG | RESPIRATION RATE: 18 BRPM | WEIGHT: 158 LBS | OXYGEN SATURATION: 100 % | BODY MASS INDEX: 29.83 KG/M2

## 2020-02-05 DIAGNOSIS — J18.9 PNEUMONIA OF LEFT UPPER LOBE DUE TO INFECTIOUS ORGANISM: Primary | ICD-10-CM

## 2020-02-05 DIAGNOSIS — J18.9 PNEUMONIA OF LEFT UPPER LOBE DUE TO INFECTIOUS ORGANISM: ICD-10-CM

## 2020-02-05 PROCEDURE — 99213 OFFICE O/P EST LOW 20 MIN: CPT | Performed by: FAMILY MEDICINE

## 2020-02-05 PROCEDURE — 71046 X-RAY EXAM CHEST 2 VIEWS: CPT | Performed by: FAMILY MEDICINE

## 2020-02-05 RX ORDER — CODEINE PHOSPHATE AND GUAIFENESIN 10; 100 MG/5ML; MG/5ML
5 SOLUTION ORAL 4 TIMES DAILY PRN
Qty: 240 ML | Refills: 0 | Status: SHIPPED | OUTPATIENT
Start: 2020-02-05 | End: 2020-02-20 | Stop reason: ALTCHOICE

## 2020-02-05 NOTE — PROGRESS NOTES
Follow-up from 2 days ago. Feeling worse. Tired. Has been up all night coughing. Took her third day of Z-Chay this morning. Has had codeine in the past because of her chronic pain syndrome. Is not taking codeine currently.   Getting winded walking from Oral Cap Take 1 capsule (100 mg total) by mouth 3 (three) times daily as needed for cough. 20 capsule 0   • losartan 100 MG Oral Tab Take 1 tablet (100 mg total) by mouth daily.  90 tablet 0   • hydrochlorothiazide 12.5 MG Oral Cap Take 1 capsule (12.5 mg t topically 2 (two) times daily. 1 Tube 0   • MOMETASONE FUROATE 0.1 % External Solution APPLY 1 APPLICATION TOPICALLY 2 (TWO) TIMES DAILY. 60 mL 1   • Polyethylene Glycol 3350 (MIRALAX) Oral Powd Pack Take 17 g by mouth daily.  10 each 0   • Ondansetron HCl

## 2020-02-20 ENCOUNTER — LABORATORY ENCOUNTER (OUTPATIENT)
Dept: LAB | Age: 55
End: 2020-02-20
Attending: FAMILY MEDICINE
Payer: COMMERCIAL

## 2020-02-20 ENCOUNTER — OFFICE VISIT (OUTPATIENT)
Dept: FAMILY MEDICINE CLINIC | Facility: CLINIC | Age: 55
End: 2020-02-20
Payer: COMMERCIAL

## 2020-02-20 VITALS
SYSTOLIC BLOOD PRESSURE: 128 MMHG | BODY MASS INDEX: 29.83 KG/M2 | TEMPERATURE: 98 F | DIASTOLIC BLOOD PRESSURE: 80 MMHG | RESPIRATION RATE: 16 BRPM | HEART RATE: 72 BPM | OXYGEN SATURATION: 98 % | HEIGHT: 61 IN | WEIGHT: 158 LBS

## 2020-02-20 DIAGNOSIS — M48.062 SPINAL STENOSIS OF LUMBAR REGION WITH NEUROGENIC CLAUDICATION: ICD-10-CM

## 2020-02-20 DIAGNOSIS — Z01.818 PREOP GENERAL PHYSICAL EXAM: Primary | ICD-10-CM

## 2020-02-20 DIAGNOSIS — I10 ESSENTIAL HYPERTENSION, BENIGN: ICD-10-CM

## 2020-02-20 DIAGNOSIS — M32.9 SYSTEMIC LUPUS ERYTHEMATOSUS, UNSPECIFIED SLE TYPE, UNSPECIFIED ORGAN INVOLVEMENT STATUS (HCC): ICD-10-CM

## 2020-02-20 DIAGNOSIS — Z01.818 PREOP GENERAL PHYSICAL EXAM: ICD-10-CM

## 2020-02-20 PROBLEM — Z78.0 POSTMENOPAUSAL: Status: RESOLVED | Noted: 2017-02-14 | Resolved: 2020-02-20

## 2020-02-20 LAB
ALBUMIN SERPL-MCNC: 3.9 G/DL (ref 3.4–5)
ALBUMIN/GLOB SERPL: 0.8 {RATIO} (ref 1–2)
ALP LIVER SERPL-CCNC: 74 U/L (ref 46–118)
ALT SERPL-CCNC: 23 U/L (ref 13–56)
ANION GAP SERPL CALC-SCNC: 4 MMOL/L (ref 0–18)
APTT PPP: 33.1 SECONDS (ref 25.4–36.1)
AST SERPL-CCNC: 20 U/L (ref 15–37)
BASOPHILS # BLD AUTO: 0.03 X10(3) UL (ref 0–0.2)
BASOPHILS NFR BLD AUTO: 0.4 %
BILIRUB SERPL-MCNC: 0.4 MG/DL (ref 0.1–2)
BUN BLD-MCNC: 14 MG/DL (ref 7–18)
BUN/CREAT SERPL: 16.9 (ref 10–20)
CALCIUM BLD-MCNC: 10.5 MG/DL (ref 8.5–10.1)
CHLORIDE SERPL-SCNC: 107 MMOL/L (ref 98–112)
CO2 SERPL-SCNC: 30 MMOL/L (ref 21–32)
CREAT BLD-MCNC: 0.83 MG/DL (ref 0.55–1.02)
DEPRECATED RDW RBC AUTO: 39.6 FL (ref 35.1–46.3)
EOSINOPHIL # BLD AUTO: 0.07 X10(3) UL (ref 0–0.7)
EOSINOPHIL NFR BLD AUTO: 1 %
ERYTHROCYTE [DISTWIDTH] IN BLOOD BY AUTOMATED COUNT: 14.6 % (ref 11–15)
GLOBULIN PLAS-MCNC: 4.6 G/DL (ref 2.8–4.4)
GLUCOSE BLD-MCNC: 94 MG/DL (ref 70–99)
HCT VFR BLD AUTO: 37.6 % (ref 35–48)
HGB BLD-MCNC: 11.4 G/DL (ref 12–16)
IMM GRANULOCYTES # BLD AUTO: 0.01 X10(3) UL (ref 0–1)
IMM GRANULOCYTES NFR BLD: 0.1 %
INR BLD: 1 (ref 0.9–1.1)
LYMPHOCYTES # BLD AUTO: 2.64 X10(3) UL (ref 1–4)
LYMPHOCYTES NFR BLD AUTO: 38 %
M PROTEIN MFR SERPL ELPH: 8.5 G/DL (ref 6.4–8.2)
MCH RBC QN AUTO: 23 PG (ref 26–34)
MCHC RBC AUTO-ENTMCNC: 30.3 G/DL (ref 31–37)
MCV RBC AUTO: 75.8 FL (ref 80–100)
MONOCYTES # BLD AUTO: 0.55 X10(3) UL (ref 0.1–1)
MONOCYTES NFR BLD AUTO: 7.9 %
NEUTROPHILS # BLD AUTO: 3.65 X10 (3) UL (ref 1.5–7.7)
NEUTROPHILS # BLD AUTO: 3.65 X10(3) UL (ref 1.5–7.7)
NEUTROPHILS NFR BLD AUTO: 52.6 %
OSMOLALITY SERPL CALC.SUM OF ELEC: 292 MOSM/KG (ref 275–295)
PATIENT FASTING Y/N/NP: NO
PLATELET # BLD AUTO: 298 10(3)UL (ref 150–450)
POTASSIUM SERPL-SCNC: 3.4 MMOL/L (ref 3.5–5.1)
PSA SERPL DL<=0.01 NG/ML-MCNC: 13.6 SECONDS (ref 12.5–14.7)
RBC # BLD AUTO: 4.96 X10(6)UL (ref 3.8–5.3)
SODIUM SERPL-SCNC: 141 MMOL/L (ref 136–145)
WBC # BLD AUTO: 7 X10(3) UL (ref 4–11)

## 2020-02-20 PROCEDURE — 93000 ELECTROCARDIOGRAM COMPLETE: CPT | Performed by: FAMILY MEDICINE

## 2020-02-20 PROCEDURE — 87641 MR-STAPH DNA AMP PROBE: CPT

## 2020-02-20 PROCEDURE — 36415 COLL VENOUS BLD VENIPUNCTURE: CPT | Performed by: FAMILY MEDICINE

## 2020-02-20 PROCEDURE — 85610 PROTHROMBIN TIME: CPT | Performed by: FAMILY MEDICINE

## 2020-02-20 PROCEDURE — 85025 COMPLETE CBC W/AUTO DIFF WBC: CPT | Performed by: FAMILY MEDICINE

## 2020-02-20 PROCEDURE — 85730 THROMBOPLASTIN TIME PARTIAL: CPT | Performed by: FAMILY MEDICINE

## 2020-02-20 PROCEDURE — 80053 COMPREHEN METABOLIC PANEL: CPT | Performed by: FAMILY MEDICINE

## 2020-02-20 PROCEDURE — 99214 OFFICE O/P EST MOD 30 MIN: CPT | Performed by: FAMILY MEDICINE

## 2020-02-20 NOTE — H&P
HPI:  Here for pre-operative evaluation requested by Dr. Mihaela Joe. Will have Lumbar 5- Sacral 1 Decompression Fusion  at [facility] on 3/5/2020. Radicular pain into left leg to foot. Adam Ayers     PAST MEDICAL HISTORY:  Past Medical History:   Diagnosis Date   • ANEMIA HOURS AS NEEDED FOR PAIN 90 tablet 0   • MUPIROCIN CALCIUM 2 % External Cream APPLY 1 APPLICATION TOPICALLY 3 TIMES DAILY 30 g 0   • BUTALBITAL-APAP-CAFFEINE -40 MG Oral Tab TAKE 1-2 TABLETS BY MOUTH EVERY 4 HOURS AS NEEDED FOR PAIN 20 tablet 0   • h [Mycophenolate]  Social History: Social History    Socioeconomic History      Marital status:       Spouse name: Not on file      Number of children: Not on file      Years of education: Not on file      Highest education level: Not on file    Occup diplopia or blurred vision. EARS: Denies tinnitus or decreased hearing acuity. CARDIOVASCULAR: Denies chest pain with exertion, no PND, orthopnea, or edema. No decrease in exercise tolerance. PULMONARY: Denies extreme shortness of breath with activity. cervical chains, submandibular, supraclavicular, and inguinal areas. EXTREMITIES / MUSCULOSKELETAL: 4 extremities with grossly normal ROM. NEUROLOGIC: CN's II-XII grossly intact, DTR's 2+/4+ throughout. Strength 5+/5+ x 4 ext. Alert and orientated.     E

## 2020-02-21 LAB — MRSA DNA SPEC QL NAA+PROBE: NEGATIVE

## 2020-02-26 ENCOUNTER — TELEPHONE (OUTPATIENT)
Dept: FAMILY MEDICINE CLINIC | Facility: CLINIC | Age: 55
End: 2020-02-26

## 2020-02-26 NOTE — TELEPHONE ENCOUNTER
NEEDS UA AND EKG (SIGNED) FAXED TO THEM.       PT HAVING SURGERY 3-5-20    FAX  #  [phone]  ATT : Jim Sofia

## 2020-02-27 ENCOUNTER — LAB ENCOUNTER (OUTPATIENT)
Dept: LAB | Age: 55
End: 2020-02-27
Attending: FAMILY MEDICINE
Payer: COMMERCIAL

## 2020-02-27 DIAGNOSIS — Z01.818 PREOP GENERAL PHYSICAL EXAM: ICD-10-CM

## 2020-02-27 LAB
BILIRUB UR QL STRIP.AUTO: NEGATIVE
CLARITY UR REFRACT.AUTO: CLEAR
COLOR UR AUTO: YELLOW
GLUCOSE UR STRIP.AUTO-MCNC: NEGATIVE MG/DL
NITRITE UR QL STRIP.AUTO: NEGATIVE
PH UR STRIP.AUTO: 5 [PH] (ref 4.5–8)
PROT UR STRIP.AUTO-MCNC: NEGATIVE MG/DL
RBC UR QL AUTO: NEGATIVE
SP GR UR STRIP.AUTO: 1.03 (ref 1–1.03)
UROBILINOGEN UR STRIP.AUTO-MCNC: <2 MG/DL

## 2020-02-27 PROCEDURE — 87086 URINE CULTURE/COLONY COUNT: CPT

## 2020-02-27 PROCEDURE — 81001 URINALYSIS AUTO W/SCOPE: CPT

## 2020-02-28 ENCOUNTER — TELEPHONE (OUTPATIENT)
Dept: FAMILY MEDICINE CLINIC | Facility: CLINIC | Age: 55
End: 2020-02-28

## 2020-03-05 ENCOUNTER — HOSPITAL ENCOUNTER (INPATIENT)
Facility: HOSPITAL | Age: 55
LOS: 3 days | Discharge: HOME | DRG: 455 | End: 2020-03-08
Attending: ORTHOPAEDIC SURGERY | Admitting: ORTHOPAEDIC SURGERY
Payer: COMMERCIAL

## 2020-03-05 ENCOUNTER — ANESTHESIA EVENT (OUTPATIENT)
Dept: SURGERY | Facility: HOSPITAL | Age: 55
DRG: 455 | End: 2020-03-05
Payer: COMMERCIAL

## 2020-03-05 ENCOUNTER — APPOINTMENT (OUTPATIENT)
Dept: GENERAL RADIOLOGY | Facility: HOSPITAL | Age: 55
DRG: 455 | End: 2020-03-05
Attending: ORTHOPAEDIC SURGERY
Payer: COMMERCIAL

## 2020-03-05 ENCOUNTER — ANESTHESIA (OUTPATIENT)
Dept: SURGERY | Facility: HOSPITAL | Age: 55
DRG: 455 | End: 2020-03-05
Payer: COMMERCIAL

## 2020-03-05 DIAGNOSIS — M54.50 LOW BACK PAIN, UNSPECIFIED BACK PAIN LATERALITY, UNSPECIFIED CHRONICITY, UNSPECIFIED WHETHER SCIATICA PRESENT: ICD-10-CM

## 2020-03-05 DIAGNOSIS — M43.16 SPONDYLOLISTHESIS, LUMBAR REGION: ICD-10-CM

## 2020-03-05 PROCEDURE — 4A11X4G MONITORING OF PERIPHERAL NERVOUS ELECTRICAL ACTIVITY, INTRAOPERATIVE, EXTERNAL APPROACH: ICD-10-PCS | Performed by: ORTHOPAEDIC SURGERY

## 2020-03-05 PROCEDURE — 0SB40ZZ EXCISION OF LUMBOSACRAL DISC, OPEN APPROACH: ICD-10-PCS | Performed by: ORTHOPAEDIC SURGERY

## 2020-03-05 PROCEDURE — 0SG30AJ FUSION OF LUMBOSACRAL JOINT WITH INTERBODY FUSION DEVICE, POSTERIOR APPROACH, ANTERIOR COLUMN, OPEN APPROACH: ICD-10-PCS | Performed by: ORTHOPAEDIC SURGERY

## 2020-03-05 PROCEDURE — 76000 FLUOROSCOPY <1 HR PHYS/QHP: CPT | Performed by: ORTHOPAEDIC SURGERY

## 2020-03-05 PROCEDURE — 0SG3071 FUSION OF LUMBOSACRAL JOINT WITH AUTOLOGOUS TISSUE SUBSTITUTE, POSTERIOR APPROACH, POSTERIOR COLUMN, OPEN APPROACH: ICD-10-PCS | Performed by: ORTHOPAEDIC SURGERY

## 2020-03-05 PROCEDURE — 99254 IP/OBS CNSLTJ NEW/EST MOD 60: CPT | Performed by: HOSPITALIST

## 2020-03-05 PROCEDURE — 00BT0ZZ EXCISION OF SPINAL MENINGES, OPEN APPROACH: ICD-10-PCS | Performed by: ORTHOPAEDIC SURGERY

## 2020-03-05 DEVICE — RELINE MAS MOD SCREW 6.5X45 2C: Type: IMPLANTABLE DEVICE | Site: BACK | Status: FUNCTIONAL

## 2020-03-05 DEVICE — RELINE MAS RED SCREW 6.5X45 2C: Type: IMPLANTABLE DEVICE | Site: BACK | Status: FUNCTIONAL

## 2020-03-05 DEVICE — RELINE LCK SCRW 5.5 OPEN TULIP: Type: IMPLANTABLE DEVICE | Site: BACK | Status: FUNCTIONAL

## 2020-03-05 DEVICE — RELINE MAS MOD REDUCTION EXT: Type: IMPLANTABLE DEVICE | Site: BACK | Status: FUNCTIONAL

## 2020-03-05 DEVICE — OSTEOCEL PRO MEDIUM: Type: IMPLANTABLE DEVICE | Site: BACK | Status: FUNCTIONAL

## 2020-03-05 DEVICE — RELINE MAS TI ROD 5.5X40 LRDTC: Type: IMPLANTABLE DEVICE | Site: BACK | Status: FUNCTIONAL

## 2020-03-05 DEVICE — 8.12-MODULUS TLIF 8X10X30 12: Type: IMPLANTABLE DEVICE | Site: BACK | Status: FUNCTIONAL

## 2020-03-05 DEVICE — OSTEOCEL PRO LARGE BULK BUY: Type: IMPLANTABLE DEVICE | Site: BACK | Status: FUNCTIONAL

## 2020-03-05 RX ORDER — POLYETHYLENE GLYCOL 3350 17 G/17G
17 POWDER, FOR SOLUTION ORAL DAILY PRN
Status: DISCONTINUED | OUTPATIENT
Start: 2020-03-05 | End: 2020-03-08

## 2020-03-05 RX ORDER — OXYCODONE HYDROCHLORIDE 5 MG/1
5 TABLET ORAL EVERY 4 HOURS PRN
Status: DISCONTINUED | OUTPATIENT
Start: 2020-03-05 | End: 2020-03-06

## 2020-03-05 RX ORDER — BUTALBITAL, ACETAMINOPHEN AND CAFFEINE 50; 325; 40 MG/1; MG/1; MG/1
1 TABLET ORAL EVERY 4 HOURS PRN
Status: DISCONTINUED | OUTPATIENT
Start: 2020-03-05 | End: 2020-03-08

## 2020-03-05 RX ORDER — ONDANSETRON 2 MG/ML
INJECTION INTRAMUSCULAR; INTRAVENOUS AS NEEDED
Status: DISCONTINUED | OUTPATIENT
Start: 2020-03-05 | End: 2020-03-05 | Stop reason: SURG

## 2020-03-05 RX ORDER — ACETAMINOPHEN 500 MG
1000 TABLET ORAL ONCE
Status: DISCONTINUED | OUTPATIENT
Start: 2020-03-05 | End: 2020-03-05 | Stop reason: HOSPADM

## 2020-03-05 RX ORDER — ONDANSETRON 2 MG/ML
4 INJECTION INTRAMUSCULAR; INTRAVENOUS ONCE
Status: COMPLETED | OUTPATIENT
Start: 2020-03-05 | End: 2020-03-05

## 2020-03-05 RX ORDER — ONDANSETRON 2 MG/ML
4 INJECTION INTRAMUSCULAR; INTRAVENOUS EVERY 4 HOURS PRN
Status: DISPENSED | OUTPATIENT
Start: 2020-03-05 | End: 2020-03-06

## 2020-03-05 RX ORDER — MIDAZOLAM HYDROCHLORIDE 1 MG/ML
INJECTION INTRAMUSCULAR; INTRAVENOUS AS NEEDED
Status: DISCONTINUED | OUTPATIENT
Start: 2020-03-05 | End: 2020-03-05 | Stop reason: SURG

## 2020-03-05 RX ORDER — LIDOCAINE HYDROCHLORIDE 10 MG/ML
INJECTION, SOLUTION EPIDURAL; INFILTRATION; INTRACAUDAL; PERINEURAL AS NEEDED
Status: DISCONTINUED | OUTPATIENT
Start: 2020-03-05 | End: 2020-03-05 | Stop reason: SURG

## 2020-03-05 RX ORDER — CEFAZOLIN SODIUM/WATER 2 G/20 ML
2 SYRINGE (ML) INTRAVENOUS ONCE
Status: COMPLETED | OUTPATIENT
Start: 2020-03-05 | End: 2020-03-05

## 2020-03-05 RX ORDER — CEFAZOLIN SODIUM/WATER 2 G/20 ML
2 SYRINGE (ML) INTRAVENOUS EVERY 8 HOURS
Status: COMPLETED | OUTPATIENT
Start: 2020-03-05 | End: 2020-03-06

## 2020-03-05 RX ORDER — HYDROCODONE BITARTRATE AND ACETAMINOPHEN 5; 325 MG/1; MG/1
1 TABLET ORAL AS NEEDED
Status: DISCONTINUED | OUTPATIENT
Start: 2020-03-05 | End: 2020-03-05 | Stop reason: HOSPADM

## 2020-03-05 RX ORDER — KETAMINE HYDROCHLORIDE 50 MG/ML
INJECTION, SOLUTION, CONCENTRATE INTRAMUSCULAR; INTRAVENOUS AS NEEDED
Status: DISCONTINUED | OUTPATIENT
Start: 2020-03-05 | End: 2020-03-05 | Stop reason: SURG

## 2020-03-05 RX ORDER — DIPHENHYDRAMINE HCL 25 MG
25 CAPSULE ORAL EVERY 4 HOURS PRN
Status: DISCONTINUED | OUTPATIENT
Start: 2020-03-05 | End: 2020-03-08

## 2020-03-05 RX ORDER — HYDROMORPHONE HYDROCHLORIDE 1 MG/ML
0.4 INJECTION, SOLUTION INTRAMUSCULAR; INTRAVENOUS; SUBCUTANEOUS EVERY 2 HOUR PRN
Status: DISCONTINUED | OUTPATIENT
Start: 2020-03-05 | End: 2020-03-08

## 2020-03-05 RX ORDER — DIAZEPAM 5 MG/1
5 TABLET ORAL EVERY 6 HOURS PRN
Status: DISCONTINUED | OUTPATIENT
Start: 2020-03-05 | End: 2020-03-08

## 2020-03-05 RX ORDER — HYDROCODONE BITARTRATE AND ACETAMINOPHEN 5; 325 MG/1; MG/1
2 TABLET ORAL AS NEEDED
Status: DISCONTINUED | OUTPATIENT
Start: 2020-03-05 | End: 2020-03-05 | Stop reason: HOSPADM

## 2020-03-05 RX ORDER — SODIUM CHLORIDE, SODIUM LACTATE, POTASSIUM CHLORIDE, CALCIUM CHLORIDE 600; 310; 30; 20 MG/100ML; MG/100ML; MG/100ML; MG/100ML
INJECTION, SOLUTION INTRAVENOUS CONTINUOUS
Status: DISCONTINUED | OUTPATIENT
Start: 2020-03-05 | End: 2020-03-05 | Stop reason: HOSPADM

## 2020-03-05 RX ORDER — CELECOXIB 200 MG/1
200 CAPSULE ORAL ONCE
Status: COMPLETED | OUTPATIENT
Start: 2020-03-05 | End: 2020-03-05

## 2020-03-05 RX ORDER — DEXAMETHASONE SODIUM PHOSPHATE 4 MG/ML
VIAL (ML) INJECTION AS NEEDED
Status: DISCONTINUED | OUTPATIENT
Start: 2020-03-05 | End: 2020-03-05 | Stop reason: SURG

## 2020-03-05 RX ORDER — BISACODYL 10 MG
10 SUPPOSITORY, RECTAL RECTAL
Status: DISCONTINUED | OUTPATIENT
Start: 2020-03-05 | End: 2020-03-08

## 2020-03-05 RX ORDER — BUPIVACAINE HYDROCHLORIDE AND EPINEPHRINE 5; 5 MG/ML; UG/ML
INJECTION, SOLUTION EPIDURAL; INTRACAUDAL; PERINEURAL AS NEEDED
Status: DISCONTINUED | OUTPATIENT
Start: 2020-03-05 | End: 2020-03-05 | Stop reason: HOSPADM

## 2020-03-05 RX ORDER — HYDROMORPHONE HYDROCHLORIDE 1 MG/ML
INJECTION, SOLUTION INTRAMUSCULAR; INTRAVENOUS; SUBCUTANEOUS
Status: COMPLETED
Start: 2020-03-05 | End: 2020-03-05

## 2020-03-05 RX ORDER — SODIUM CHLORIDE 9 MG/ML
INJECTION, SOLUTION INTRAVENOUS CONTINUOUS
Status: DISCONTINUED | OUTPATIENT
Start: 2020-03-05 | End: 2020-03-08

## 2020-03-05 RX ORDER — HYDROMORPHONE HYDROCHLORIDE 1 MG/ML
0.2 INJECTION, SOLUTION INTRAMUSCULAR; INTRAVENOUS; SUBCUTANEOUS EVERY 2 HOUR PRN
Status: DISCONTINUED | OUTPATIENT
Start: 2020-03-05 | End: 2020-03-08

## 2020-03-05 RX ORDER — SODIUM CHLORIDE, SODIUM LACTATE, POTASSIUM CHLORIDE, CALCIUM CHLORIDE 600; 310; 30; 20 MG/100ML; MG/100ML; MG/100ML; MG/100ML
INJECTION, SOLUTION INTRAVENOUS CONTINUOUS
Status: DISCONTINUED | OUTPATIENT
Start: 2020-03-05 | End: 2020-03-08

## 2020-03-05 RX ORDER — BACITRACIN 50000 [USP'U]/1
INJECTION, POWDER, LYOPHILIZED, FOR SOLUTION INTRAMUSCULAR AS NEEDED
Status: DISCONTINUED | OUTPATIENT
Start: 2020-03-05 | End: 2020-03-05 | Stop reason: HOSPADM

## 2020-03-05 RX ORDER — HYDROMORPHONE HYDROCHLORIDE 1 MG/ML
0.8 INJECTION, SOLUTION INTRAMUSCULAR; INTRAVENOUS; SUBCUTANEOUS EVERY 2 HOUR PRN
Status: DISCONTINUED | OUTPATIENT
Start: 2020-03-05 | End: 2020-03-08

## 2020-03-05 RX ORDER — GABAPENTIN 600 MG/1
600 TABLET ORAL ONCE
Status: COMPLETED | OUTPATIENT
Start: 2020-03-05 | End: 2020-03-05

## 2020-03-05 RX ORDER — ONDANSETRON 2 MG/ML
4 INJECTION INTRAMUSCULAR; INTRAVENOUS AS NEEDED
Status: DISCONTINUED | OUTPATIENT
Start: 2020-03-05 | End: 2020-03-05 | Stop reason: HOSPADM

## 2020-03-05 RX ORDER — METOCLOPRAMIDE HYDROCHLORIDE 5 MG/ML
10 INJECTION INTRAMUSCULAR; INTRAVENOUS EVERY 6 HOURS PRN
Status: DISCONTINUED | OUTPATIENT
Start: 2020-03-05 | End: 2020-03-08

## 2020-03-05 RX ORDER — DOCUSATE SODIUM 100 MG/1
100 CAPSULE, LIQUID FILLED ORAL 2 TIMES DAILY
Status: DISCONTINUED | OUTPATIENT
Start: 2020-03-05 | End: 2020-03-08

## 2020-03-05 RX ORDER — OXYCODONE HYDROCHLORIDE 10 MG/1
10 TABLET ORAL EVERY 4 HOURS PRN
Status: DISCONTINUED | OUTPATIENT
Start: 2020-03-05 | End: 2020-03-06

## 2020-03-05 RX ORDER — NALOXONE HYDROCHLORIDE 0.4 MG/ML
80 INJECTION, SOLUTION INTRAMUSCULAR; INTRAVENOUS; SUBCUTANEOUS AS NEEDED
Status: DISCONTINUED | OUTPATIENT
Start: 2020-03-05 | End: 2020-03-05 | Stop reason: HOSPADM

## 2020-03-05 RX ORDER — DEXMEDETOMIDINE HYDROCHLORIDE 4 UG/ML
INJECTION, SOLUTION INTRAVENOUS CONTINUOUS PRN
Status: DISCONTINUED | OUTPATIENT
Start: 2020-03-05 | End: 2020-03-05 | Stop reason: SURG

## 2020-03-05 RX ORDER — DIAZEPAM 5 MG/ML
INJECTION, SOLUTION INTRAMUSCULAR; INTRAVENOUS
Status: COMPLETED
Start: 2020-03-05 | End: 2020-03-05

## 2020-03-05 RX ORDER — PHENYLEPHRINE HCL 10 MG/ML
VIAL (ML) INJECTION AS NEEDED
Status: DISCONTINUED | OUTPATIENT
Start: 2020-03-05 | End: 2020-03-05 | Stop reason: SURG

## 2020-03-05 RX ORDER — DIPHENHYDRAMINE HYDROCHLORIDE 50 MG/ML
25 INJECTION INTRAMUSCULAR; INTRAVENOUS EVERY 4 HOURS PRN
Status: DISCONTINUED | OUTPATIENT
Start: 2020-03-05 | End: 2020-03-08

## 2020-03-05 RX ORDER — SODIUM PHOSPHATE, DIBASIC AND SODIUM PHOSPHATE, MONOBASIC 7; 19 G/133ML; G/133ML
1 ENEMA RECTAL ONCE AS NEEDED
Status: DISCONTINUED | OUTPATIENT
Start: 2020-03-05 | End: 2020-03-08

## 2020-03-05 RX ORDER — DIAZEPAM 5 MG/ML
5 INJECTION, SOLUTION INTRAMUSCULAR; INTRAVENOUS ONCE
Status: COMPLETED | OUTPATIENT
Start: 2020-03-05 | End: 2020-03-05

## 2020-03-05 RX ORDER — MORPHINE SULFATE 15 MG/1
15 TABLET ORAL EVERY 4 HOURS PRN
Status: DISCONTINUED | OUTPATIENT
Start: 2020-03-05 | End: 2020-03-06

## 2020-03-05 RX ORDER — EPHEDRINE SULFATE 50 MG/ML
INJECTION, SOLUTION INTRAVENOUS AS NEEDED
Status: DISCONTINUED | OUTPATIENT
Start: 2020-03-05 | End: 2020-03-05 | Stop reason: SURG

## 2020-03-05 RX ORDER — TIZANIDINE 4 MG/1
4 TABLET ORAL 3 TIMES DAILY PRN
Status: DISCONTINUED | OUTPATIENT
Start: 2020-03-05 | End: 2020-03-08

## 2020-03-05 RX ORDER — HYDROMORPHONE HYDROCHLORIDE 1 MG/ML
0.4 INJECTION, SOLUTION INTRAMUSCULAR; INTRAVENOUS; SUBCUTANEOUS EVERY 5 MIN PRN
Status: DISCONTINUED | OUTPATIENT
Start: 2020-03-05 | End: 2020-03-05 | Stop reason: HOSPADM

## 2020-03-05 RX ORDER — AMLODIPINE BESYLATE 5 MG/1
5 TABLET ORAL DAILY
Status: DISCONTINUED | OUTPATIENT
Start: 2020-03-06 | End: 2020-03-08

## 2020-03-05 RX ADMIN — PHENYLEPHRINE HCL 100 MCG: 10 MG/ML VIAL (ML) INJECTION at 13:25:00

## 2020-03-05 RX ADMIN — PHENYLEPHRINE HCL 50 MCG: 10 MG/ML VIAL (ML) INJECTION at 13:20:00

## 2020-03-05 RX ADMIN — SODIUM CHLORIDE, SODIUM LACTATE, POTASSIUM CHLORIDE, CALCIUM CHLORIDE: 600; 310; 30; 20 INJECTION, SOLUTION INTRAVENOUS at 14:53:00

## 2020-03-05 RX ADMIN — PHENYLEPHRINE HCL 100 MCG: 10 MG/ML VIAL (ML) INJECTION at 13:53:00

## 2020-03-05 RX ADMIN — PHENYLEPHRINE HCL 50 MCG: 10 MG/ML VIAL (ML) INJECTION at 13:13:00

## 2020-03-05 RX ADMIN — MIDAZOLAM HYDROCHLORIDE 2 MG: 1 INJECTION INTRAMUSCULAR; INTRAVENOUS at 12:33:00

## 2020-03-05 RX ADMIN — SODIUM CHLORIDE, SODIUM LACTATE, POTASSIUM CHLORIDE, CALCIUM CHLORIDE: 600; 310; 30; 20 INJECTION, SOLUTION INTRAVENOUS at 14:42:00

## 2020-03-05 RX ADMIN — DEXAMETHASONE SODIUM PHOSPHATE 4 MG: 4 MG/ML VIAL (ML) INJECTION at 12:40:00

## 2020-03-05 RX ADMIN — DEXMEDETOMIDINE HYDROCHLORIDE 0.4 MCG/KG/HR: 4 INJECTION, SOLUTION INTRAVENOUS at 13:25:00

## 2020-03-05 RX ADMIN — KETAMINE HYDROCHLORIDE 30 MG: 50 INJECTION, SOLUTION, CONCENTRATE INTRAMUSCULAR; INTRAVENOUS at 12:33:00

## 2020-03-05 RX ADMIN — LIDOCAINE HYDROCHLORIDE 50 MG: 10 INJECTION, SOLUTION EPIDURAL; INFILTRATION; INTRACAUDAL; PERINEURAL at 12:33:00

## 2020-03-05 RX ADMIN — EPHEDRINE SULFATE 10 MG: 50 INJECTION, SOLUTION INTRAVENOUS at 14:21:00

## 2020-03-05 RX ADMIN — DEXMEDETOMIDINE HYDROCHLORIDE 0.5 MCG/KG/HR: 4 INJECTION, SOLUTION INTRAVENOUS at 13:01:00

## 2020-03-05 RX ADMIN — CEFAZOLIN SODIUM/WATER 2 G: 2 G/20 ML SYRINGE (ML) INTRAVENOUS at 12:49:00

## 2020-03-05 RX ADMIN — ONDANSETRON 4 MG: 2 INJECTION INTRAMUSCULAR; INTRAVENOUS at 14:21:00

## 2020-03-05 NOTE — PROGRESS NOTES
S: Moderate back pain. No leg pain or numbness. No weakness. No headaches. Inspection:  Awake alert No acute distress. No difficulty breathing     Blood pressure 96/78, pulse 89, temperature 98 °F (36.7 °C), temperature source Temporal, resp.  rate 2

## 2020-03-05 NOTE — ANESTHESIA POSTPROCEDURE EVALUATION
[facility] Patient Status:  Surgery Admit - Inpt   Age/Gender 62year old female MRN [id number]   [facility] Attending Porsche Leon MD   Hosp Day # 0 PCP Elmer Pardo MD       Anesthesia Post-op Note    Pro

## 2020-03-05 NOTE — ANESTHESIA PREPROCEDURE EVALUATION
PRE-OP EVALUATION    Patient Name: Arycas Cisneros    Pre-op Diagnosis: Low back pain, unspecified back pain laterality, unspecified chronicity, unspecified whether sciatica present [M54.5]  Spondylolisthesis, lumbar region [M43.16]    Procedure(s):  Lumbar mouth daily. , Disp: 10 each, Rfl: 0        Allergies: Cellcept [Mycophenolate]      Anesthesia Evaluation        Anesthetic Complications  (-) history of anesthetic complications         GI/Hepatic/Renal    Negative GI/hepatic/renal ROS. 0.83 02/20/2020    GLU 94 02/20/2020    CA 10.5 (H) 02/20/2020     Lab Results   Component Value Date    INR 1.00 02/20/2020         Airway      Mallampati: II    TM distance: > 6 cm  Neck ROM: full Cardiovascular      Rhythm: regular  Rate: normal     Everlina Amass

## 2020-03-05 NOTE — H&P
Agree with below. Patient would like to proceed with the elective L5-S1 decompression and interbody fusion. Blanche Mcneal PA-C  HPI:  Here for pre-operative evaluation requested by Dr. Brandon Glover.   Will have Lumbar 5- Sacral 1 Decompression Fusion  at [facility] o daily. 90 tablet 0   • hydrochlorothiazide 12.5 MG Oral Cap Take 1 capsule (12.5 mg total) by mouth daily.  90 capsule 0   • TRAMADOL HCL 50 MG Oral Tab TAKE 1 TABLET BY MOUTH EVERY 6 HOURS AS NEEDED FOR PAIN 90 tablet 0   • MUPIROCIN CALCIUM 2 % External C once daily. (Patient not taking: Reported on 2/20/2020 ) 30 tablet 5   • Losartan Potassium-HCTZ 100-12.5 MG Oral Tab Take 1 tablet by mouth daily.  90 tablet 3      ALLERGIES: Cellcept [Mycophenolate]  Social History:   Social History       Socioeconomic H Helmet: Not Asked        Seat Belt: Not Asked        Self-Exams: Not Asked    Social History Narrative      Not on file         REVIEW OF SYSTEMS:  GENERAL: Feeling generally well. EYES: Denies diplopia or blurred vision.   EARS: Denies tinnitus or decreas wheezes, or rales. No dullness to percussion. ABDOMEN: Soft, nontender, nondistended, NABS. No HSM; no masses; no bruits.    LYMPHATIC: no lymphadenopathy palpated about the anterior and posterior cervical chains, submandibular, supraclavicular, and inguin

## 2020-03-05 NOTE — ANESTHESIA PROCEDURE NOTES
Airway  Date/Time: 3/5/2020 12:34 PM  Urgency: elective    Airway not difficult    General Information and Staff    Patient location during procedure: OR  Anesthesiologist: Kevin Fields DO  Resident/CRNA: Bette Alejandro CRNA  Performed: KAYDEN     I

## 2020-03-05 NOTE — BRIEF OP NOTE
Pre-Operative Diagnosis: Low back pain, unspecified back pain laterality, unspecified chronicity, unspecified whether sciatica present [M54.5]  Spondylolisthesis, lumbar region [M43.16]     Post-Operative Diagnosis: Low back pain, unspecified back pain lat

## 2020-03-06 LAB
DEPRECATED RDW RBC AUTO: 36.9 FL (ref 35.1–46.3)
ERYTHROCYTE [DISTWIDTH] IN BLOOD BY AUTOMATED COUNT: 14 % (ref 11–15)
HCT VFR BLD AUTO: 30.8 % (ref 35–48)
HGB BLD-MCNC: 9.7 G/DL (ref 12–16)
MCH RBC QN AUTO: 23.2 PG (ref 26–34)
MCHC RBC AUTO-ENTMCNC: 31.5 G/DL (ref 31–37)
MCV RBC AUTO: 73.5 FL (ref 80–100)
PLATELET # BLD AUTO: 252 10(3)UL (ref 150–450)
RBC # BLD AUTO: 4.19 X10(6)UL (ref 3.8–5.3)
WBC # BLD AUTO: 11.2 X10(3) UL (ref 4–11)

## 2020-03-06 PROCEDURE — 99232 SBSQ HOSP IP/OBS MODERATE 35: CPT | Performed by: HOSPITALIST

## 2020-03-06 RX ORDER — HYDROCODONE BITARTRATE AND ACETAMINOPHEN 10; 325 MG/1; MG/1
2 TABLET ORAL EVERY 6 HOURS PRN
Status: DISCONTINUED | OUTPATIENT
Start: 2020-03-06 | End: 2020-03-08

## 2020-03-06 RX ORDER — HYDROCODONE BITARTRATE AND ACETAMINOPHEN 10; 325 MG/1; MG/1
1 TABLET ORAL EVERY 6 HOURS PRN
Status: DISCONTINUED | OUTPATIENT
Start: 2020-03-06 | End: 2020-03-08

## 2020-03-06 NOTE — PLAN OF CARE
Patient alert but drowsy and sleepy ,able to get up with min assist to bathroom ,voided with no difficulty ,vital signs stable ,,on 2L of o2 via cannula ,o2 sats in the 90's ,denies any chest pain or short of breath ,pain is controlled with oxycodone ,joanne

## 2020-03-06 NOTE — PHYSICAL THERAPY NOTE
PHYSICAL THERAPY EVALUATION - INPATIENT     Room Number: 381/381-A  Evaluation Date: 3/6/2020  Type of Evaluation: Initial  Physician Order: PT Eval and Treat    Presenting Problem: L5-S1 decompression fusion 3/5/20  Reason for Therapy: Mobility Dysf 5/13/2019    Performed by Marylee Parry, MD at [address]   • OTHER      ovarian cystectomy   • OTHER SURGICAL HISTORY  2008    uterine ablation   • OTHER SURGICAL HISTORY  2007    fattty tumor removal, left flank   • TUBAL LIGATION bedclothes, sheets and blankets)?: A Little   -   Sitting down on and standing up from a chair with arms (e.g., wheelchair, bedside commode, etc.): A Little   -   Moving from lying on back to sitting on the side of the bed?: A Little   How much help from a with the following impairments increased pain with activity, dec activity endurance, dec dynamic standing balance. Functional outcome measures completed include AM-PAC.  The AM-PAC '6-Clicks' Inpatient Basic Mobility Short Form was completed and this patie precautions during activity.    Goal #6    Goal Comments: Goals established on 3/6/2020

## 2020-03-06 NOTE — PLAN OF CARE
Verbalized pain level to lower back \"at a 20\". Percovet tablet given at 0730 and Zanaflex 0915. Patient able to participate with PT, slow but steady gait using a walker with standby assistance but had a lot of pain.   After therapy patient stayed up in

## 2020-03-06 NOTE — CONSULTS
DANYA HOSPITALIST  CONSULT     Eli Rosas Patient Status:  Inpatient    3/17/1965 MRN [id number]   [facility] 3SW-A Attending Flavia Heard MD   Hosp Day # 0 PCP Bettye Jauregui MD     Reason for consult: Medical management    Requ LUMBAR EPIDURAL N/A 5/13/2019    Performed by Santy Shaikh MD at [address]   • OTHER      ovarian cystectomy   • OTHER SURGICAL HISTORY  2008    uterine ablation   • OTHER SURGICAL HISTORY  2007    fattty tumor removal, left flank affected skin twice daily, Disp: 56 g, Rfl: 5  CLOBETASOL PROPIONATE 0.05 % External Solution, APPLY 5 ML TOPICALLY 2 (TWO) TIMES DAILY. , Disp: 50 mL, Rfl: 2  Losartan Potassium-HCTZ 100-12.5 MG Oral Tab, Take 1 tablet by mouth daily. , Disp: 90 tablet, Rfl hours.    Invalid input(s): LYM#, MONO#, BASOS#, EOSIN#    No results for input(s): GLU, BUN, CREATSERUM, GFRAA, GFRNAA, CA, ALB, NA, K, CL, CO2, ALKPHO, AST, ALT, BILT, TP in the last 168 hours. No results for input(s): PTP, INR in the last 168 hours.

## 2020-03-06 NOTE — OCCUPATIONAL THERAPY NOTE
OCCUPATIONAL THERAPY EVALUATION - INPATIENT     Room Number: 381/381-A  Evaluation Date: 3/6/2020  Type of Evaluation: Initial  Presenting Problem: back pain    Physician Order: IP Consult to Occupational Therapy  Reason for Therapy: ADL/IADL Dysfunction a MONITORING N/A 3/5/2020    Performed by Nicole Patterson MD at [address] [facility]   • LUMBAR EPIDURAL N/A 5/13/2019    Performed by Reyna Shine MD at [address] [facility] POST LAT INTERBODY FUS 1 LEVEL N/A 3/5/2020    Performe extremity strength is within functional limits     COORDINATION  Gross Motor    [facility]    Fine Motor    [facility]      ADDITIONAL TESTS             NEUROLOGICAL FINDINGS  Neurological Findings: None                ACTIVITY TOLERANCE                         O2 SATUR of session. Patient End of Session: Up in chair;Needs met;Call light within reach;RN aware of session/findings;Bracing education provided; All patient questions and concerns addressed;SCDs in place    ASSESSMENT     Patient is a 62year old female admitte Overall Complexity LOW     OT Discharge Recommendations: Home; Intermittent Supervision  OT Device Recommendations: Reacher;Sock aid    PLAN  OT Treatment Plan: Balance activities; Energy conservation/work simplification techniques;ADL training;IADL traini

## 2020-03-06 NOTE — PLAN OF CARE
Patient admitted to unit from PACU at approx 1700 via bed and transport. Spouse Leif at bedside. Welcomed and oriented to unit, order sets, POC, safety, call and phone systems. Call don't fall, IS and ankle exercises. Discussed pain management POC.  Disc

## 2020-03-06 NOTE — PROGRESS NOTES
DANYA HOSPITALIST  Progress Note     Thaddeus Pittman Patient Status:  Inpatient    3/17/1965 MRN [id number]   [facility] 3SW-A Attending Bridgette Mcdonough MD   Hosp Day # 1 PCP Natalie Sharma MD     Chief Complaint: back surgery   S:  Pain

## 2020-03-06 NOTE — PROGRESS NOTES
S: Moderate back pain with no leg pain. No new numbness or weakness. No headaches. She has not moved much since yesterday due to increased pain and drowsiness. Inspection:  Awake alert No acute distress.  No difficulty breathing     Blood pressure 119/6

## 2020-03-07 PROCEDURE — 99232 SBSQ HOSP IP/OBS MODERATE 35: CPT | Performed by: HOSPITALIST

## 2020-03-07 NOTE — PROGRESS NOTES
DANYA HOSPITALIST  Progress Note     Jade Pauly Patient Status:  Inpatient    3/17/1965 MRN [id number]   [facility] 3SW-A Attending Dayo Vazquez MD   Hosp Day # 2 PCP Lavon Muñoz MD     Chief Complaint: back surgery   S:  Joselin

## 2020-03-07 NOTE — PROGRESS NOTES
S: Moderate back pain with no leg pain. Mild numbness in the left leg. No weakness. No headaches. Mild nausea. She would like to stay one more night. Inspection:  Awake alert No acute distress.  No difficulty breathing     Blood pressure 122/74, p

## 2020-03-07 NOTE — OCCUPATIONAL THERAPY NOTE
OCCUPATIONAL THERAPY TREATMENT NOTE - INPATIENT     Room Number: 381/381-A  Session: 1   Number of Visits to Meet Established Goals: 2    Presenting Problem: back pain    History related to current admission: Pt is 61 yo female admitted from home for L5-S1 Performed by Yady More MD at [address] [facility] POST LAT INTERBODY FUS 1 LEVEL N/A 3/5/2020    Performed by Carol Ahuja MD at [address] [facility] OR   • OTHER      ovarian cystectomy   • OTHER SURGICAL HISTORY  2008    ut knees using adapted cross leg technique with supervision, stood with supervision and RW and pulled up over waist with supervision. She ambulated with RW and supervision to bathroom.   She demonstrates toilet transfers from an elevated commode height with s will transfer from sit to stand:  with supervision Met 3/7  Patient will transfer form stand to sit:  with supervision Met 3/7     UE Exercise Program Goal  Patient will be independent with bilateral AROM HEP (home exercise program).     Additional Goals:

## 2020-03-07 NOTE — CM/SW NOTE
PT recommending HH. Referral sent to [facility]. Await acceptance. Orders entered. HOME SITUATION  Type of Home: House   Home Layout: Two level; Able to live on main level  Stairs to Enter : 2  Railing: Yes  Stairs to Bedroom: 14  Railing:  Yes     Lives [place]

## 2020-03-07 NOTE — PLAN OF CARE
Pain control better tonight. Takes Norco prn with adequate relief. Pt had episode of feeling lightheaded after taking Zanaflex last night. Will closely monitor. VSS, MF to surg site cdi. Denies n/t to ble. Pt ambulated in hallway with brace on.  Needs to do

## 2020-03-07 NOTE — PHYSICAL THERAPY NOTE
PHYSICAL THERAPY TREATMENT NOTE - INPATIENT    Room Number: 381/381-A     Session: 1    Number of Visits to Meet Established Goals: 3    Presenting Problem: L5-S1 decompression fusion 3/5/20    Problem List  Active Problems:    Systemic lupus erythematosu SURGICAL HISTORY  2008    uterine ablation   • OTHER SURGICAL HISTORY  2007    fattty tumor removal, left flank   • TUBAL LIGATION         SUBJECTIVE  She was throwing up earlier today    Patient’s self-stated goal is Home    OBJECTIVE  Precautions: Spine regards to LSO use, bed mobilities using log roll technique and spine precaution. Has chair back brace in placed and is aware that she is to wear when OOB.  Gt training with RW with slow and steady gt on plain surface ' and gt training with stair audrey demonstrate supine - sit EOB @ level: supervision with log roll technique.       Goal #2 Patient is able to demonstrate transfers EOB to/from Chair/Wheelchair at assistance level: supervision      Goal #3 Patient is able to ambulate 300 feet with assist dev

## 2020-03-08 VITALS
DIASTOLIC BLOOD PRESSURE: 75 MMHG | HEART RATE: 90 BPM | WEIGHT: 158 LBS | BODY MASS INDEX: 29.83 KG/M2 | TEMPERATURE: 99 F | RESPIRATION RATE: 16 BRPM | HEIGHT: 61 IN | OXYGEN SATURATION: 95 % | SYSTOLIC BLOOD PRESSURE: 138 MMHG

## 2020-03-08 PROCEDURE — 99232 SBSQ HOSP IP/OBS MODERATE 35: CPT | Performed by: HOSPITALIST

## 2020-03-08 RX ORDER — LOSARTAN POTASSIUM 100 MG/1
100 TABLET ORAL DAILY
Status: DISCONTINUED | OUTPATIENT
Start: 2020-03-08 | End: 2020-03-08

## 2020-03-08 NOTE — OCCUPATIONAL THERAPY NOTE
OCCUPATIONAL THERAPY TREATMENT NOTE - INPATIENT     Room Number: 381/381-A  Session: 2  Number of Visits to Meet Established Goals: 2    Presenting Problem: back pain    History related to current admission: Pt is 61 yo female admitted from home for L5-S1 by Sea Bella MD at [address] [facility] POST LAT INTERBODY FUS 1 LEVEL N/A 3/5/2020    Performed by Armani Segal MD at [facility] MAIN OR   • OTHER      ovarian cystectomy   • OTHER SURGICAL HISTORY  2008    uterine abla ind),   UB dress (seated, mod ind)  Brace management (mod ind in seated)  LB Dress (don/doff socks seated in figure 4 no AE with mod ind, donned pants seated no AE with mod ind ).     Discussion on walk in shower transfer and use of LHS, seat and cup in bebe Program Goal  Patient will be independent with bilateral AROM HEP (home exercise program).   -D/C 3/8 as pt to avoid BUE HEP at this time     Additional Goals:  Pt will verbalize and incorporate 3/3 spinal precautions into ADL completion utilizing AD as nee

## 2020-03-08 NOTE — PLAN OF CARE
Still has on and off nausea, antiemetic given as needed. Abdomen soft and non-distended. Pain well controlled with Norco and Valium. VSS, spo2 wnl on RA. Denies n/t to ble, strong dorsi/plantar flexion. Plan is d/c home today.

## 2020-03-08 NOTE — PLAN OF CARE
Pt c/o nausea and emesis this am.  Reglan prn with relief. Pain and nausea better this afternoon. Walked in [place] with SBA. Dressing change demonstrated to spouse. New dressing dry and intact to back. Wears brace when OOB. Will continue to monitor.

## 2020-03-08 NOTE — PROGRESS NOTES
DANYA HOSPITALIST  Progress Note     Marlena Graham Patient Status:  Inpatient    3/17/1965 MRN [id number]   [facility] 3SW-A Attending Kike Petty MD   Hosp Day # 3 PCP Afsaneh Canas MD     Chief Complaint: back surgery   S:  Joselin

## 2020-03-08 NOTE — PROGRESS NOTES
S: Moderate back pain with no leg pain. No new numbness or weakness. She is doing much better today, and wants to go home. She has no headaches. Overall, she is very pleased. Inspection:  Awake alert No acute distress.  No difficulty breathing     B

## 2020-03-08 NOTE — PLAN OF CARE
Pt is Aox4, on R. A.  VS are stable, voiding freely, had a BM today. Pt c/o moderate  pain to her back, no leg pain , no new numbness or tingling. Pain is well relieved by PO pain meds. Incision is with gauze and tape CDI, C/B brace kept on when OOB.  Up

## 2020-03-12 RX ORDER — ALBUTEROL SULFATE 90 UG/1
AEROSOL, METERED RESPIRATORY (INHALATION)
Qty: 18 G | Refills: 1 | OUTPATIENT
Start: 2020-03-12

## 2020-03-17 NOTE — DISCHARGE SUMMARY
[facility]  Discharge Summary    Sanjana Lopez Patient Status:  Inpatient    3/17/1965 MRN [id number]   [facility] 3SW-A Attending No att. providers found   Hosp Day # 3 PCP Anand Barrera MD     Date of Admission: 3/5/2020    Date o consultations throughout the hospital stay. The patient participated in Physical and Occupational Therapy making steady progressive gains. Once deemed stable by all services the patient was discharged on the above date.   Standard discharge instructions w R-3    Metoclopramide HCl 10 MG Oral Tab  Take 1 tablet (10 mg total) by mouth 3 (three) times daily as needed (nausea or migraine). , Normal, Disp-20 tablet, R-0    betamethasone dipropionate 0.05 % External Cream  Apply 1 Application topically 2 (two) [place]

## 2020-03-24 ENCOUNTER — PATIENT OUTREACH (OUTPATIENT)
Dept: CASE MANAGEMENT | Age: 55
End: 2020-03-24

## 2020-03-24 DIAGNOSIS — M51.36 DEGENERATIVE DISC DISEASE, LUMBAR: ICD-10-CM

## 2020-03-24 DIAGNOSIS — M48.02 CERVICAL STENOSIS OF SPINAL CANAL: ICD-10-CM

## 2020-03-24 PROCEDURE — 1111F DSCHRG MED/CURRENT MED MERGE: CPT

## 2020-03-24 NOTE — PROGRESS NOTES
[facility] message sent to the pt for condition update regarding ortho surgery.

## 2020-03-25 NOTE — PAYOR COMM NOTE
--------------  DISCHARGE REVIEW   3-8-20     ++++PLEASE CONFIRM DAYS CERTIFIED       Payor: SHAVON Pelaez #:  [id number]  Authorization Number: pend #[id number]    Admit date: 3/5/20  Admit time:  1701  Discharge Date: 3/8/2020  1:21 PM Disp-60 tablet, R-0    tiZANidine HCl 4 MG Oral Tab  Take 1 tablet (4 mg total) by mouth every 6 (six) hours as needed., Normal, Disp-30 tablet, R-1    predniSONE 5 MG Oral Tab  Take 1 tablet (5 mg total) by mouth once daily. , Normal, Disp-30 tablet, R-5

## 2020-03-25 NOTE — PAYOR COMM NOTE
--------------  CONTINUED STAY REVIEW   3-7-20       Payor: [facility] Christine Loaiza #:  [id number]  Authorization Number: pend #[id number]    Admit date: 3/5/20  Admit time: 80    Admitting Physician: Flavia Heard MD  Attending Physician:  Agnieszka att.  to assist as needed. Also address car transfers. Felt 'hot and slightly nauseated ' after the car transfer simulation training that she has to be brought back to her room with w/c

## 2020-03-25 NOTE — PROGRESS NOTES
Initial Post Discharge Follow Up   Discharge Date: 3/8/20  Contact Date: 3/25/2020    Consent Verification:  Assessment Completed With: Patient  HIPAA Verified?   Yes    Discharge Dx:   Lumbar 5- Sacral 1 Decompression Fusion by Dr. Simone Fisher:   • less narcotic pain medication as your pain decreased at home? yes  o  On a scale of 1-5   1- Very Poor/unclear and 5- Very well/clear  o Very well  o Comments:  • Were you satisfied with the discharge process?   yes  (For SPINE PATIENTS only)      • Did you

## 2020-04-27 RX ORDER — MOMETASONE FUROATE 1 MG/ML
SOLUTION TOPICAL
Qty: 60 ML | Refills: 0 | Status: SHIPPED | OUTPATIENT
Start: 2020-04-27 | End: 2020-07-13

## 2020-05-11 DIAGNOSIS — S39.012A BACK STRAIN, INITIAL ENCOUNTER: ICD-10-CM

## 2020-05-11 RX ORDER — PREDNISONE 20 MG/1
TABLET ORAL
Qty: 10 TABLET | Refills: 0 | Status: SHIPPED | OUTPATIENT
Start: 2020-05-11 | End: 2020-06-02 | Stop reason: ALTCHOICE

## 2020-05-11 NOTE — TELEPHONE ENCOUNTER
Last office visit: 02/20/2020 for preop exam  Last refill: 07/30/2019    Please approve or denyRx request below

## 2020-05-12 RX ORDER — AMLODIPINE BESYLATE 5 MG/1
TABLET ORAL
Qty: 90 TABLET | Refills: 2 | Status: SHIPPED | OUTPATIENT
Start: 2020-05-12 | End: 2021-05-24

## 2020-05-12 RX ORDER — LOSARTAN POTASSIUM 100 MG/1
TABLET ORAL
Qty: 90 TABLET | Refills: 0 | Status: SHIPPED | OUTPATIENT
Start: 2020-05-12 | End: 2020-08-14

## 2020-05-12 RX ORDER — HYDROCHLOROTHIAZIDE 12.5 MG/1
CAPSULE, GELATIN COATED ORAL
Qty: 90 CAPSULE | Refills: 0 | Status: SHIPPED | OUTPATIENT
Start: 2020-05-12 | End: 2020-10-29 | Stop reason: ALTCHOICE

## 2020-06-04 DIAGNOSIS — S39.012A BACK STRAIN, INITIAL ENCOUNTER: ICD-10-CM

## 2020-06-04 RX ORDER — PREDNISONE 20 MG/1
TABLET ORAL
Qty: 10 TABLET | Refills: 0 | Status: SHIPPED | OUTPATIENT
Start: 2020-06-04 | End: 2020-11-02

## 2020-07-12 DIAGNOSIS — L30.9 DERMATITIS: ICD-10-CM

## 2020-07-13 RX ORDER — MOMETASONE FUROATE 1 MG/ML
SOLUTION TOPICAL
Qty: 60 ML | Refills: 0 | Status: SHIPPED | OUTPATIENT
Start: 2020-07-13 | End: 2020-11-02

## 2020-07-13 RX ORDER — KETOCONAZOLE 20 MG/ML
5 SHAMPOO TOPICAL
Qty: 120 ML | Refills: 3 | Status: SHIPPED | OUTPATIENT
Start: 2020-07-13 | End: 2021-04-22

## 2020-08-14 ENCOUNTER — TELEPHONE (OUTPATIENT)
Dept: FAMILY MEDICINE CLINIC | Facility: CLINIC | Age: 55
End: 2020-08-14

## 2020-08-14 PROBLEM — R53.83 FATIGUE, UNSPECIFIED TYPE: Status: ACTIVE | Noted: 2020-08-14

## 2020-08-14 NOTE — TELEPHONE ENCOUNTER
guaiFENesin-codeine 100-10 MG/5ML Oral Solution     [facility] # [place] [phone], [phone]    This was last filled in February. We scheduled appt because I felt she was going to need one.

## 2020-08-17 ENCOUNTER — VIRTUAL PHONE E/M (OUTPATIENT)
Dept: FAMILY MEDICINE CLINIC | Facility: CLINIC | Age: 55
End: 2020-08-17
Payer: COMMERCIAL

## 2020-08-17 DIAGNOSIS — J18.9 PNEUMONIA OF LEFT UPPER LOBE DUE TO INFECTIOUS ORGANISM: Primary | ICD-10-CM

## 2020-08-17 PROCEDURE — 99442 PHONE E/M BY PHYS 11-20 MIN: CPT | Performed by: FAMILY MEDICINE

## 2020-08-17 NOTE — PROGRESS NOTES
Virtual Telephone Check-In    Jami peraza Virtual/Telephone Check-In visit on 08/17/20. Patient has been referred to the [facility] website at [email or web address] to review the yearly Consent to Treat document.     Patient understands and accepts fi

## 2020-09-10 RX ORDER — LOSARTAN POTASSIUM AND HYDROCHLOROTHIAZIDE 12.5; 1 MG/1; MG/1
TABLET ORAL
Qty: 90 TABLET | Refills: 0 | Status: SHIPPED | OUTPATIENT
Start: 2020-09-10 | End: 2020-11-02

## 2020-10-29 ENCOUNTER — OFFICE VISIT (OUTPATIENT)
Dept: FAMILY MEDICINE CLINIC | Facility: CLINIC | Age: 55
End: 2020-10-29
Payer: COMMERCIAL

## 2020-10-29 VITALS
OXYGEN SATURATION: 98 % | RESPIRATION RATE: 16 BRPM | WEIGHT: 163 LBS | BODY MASS INDEX: 30.78 KG/M2 | DIASTOLIC BLOOD PRESSURE: 82 MMHG | HEART RATE: 96 BPM | HEIGHT: 61 IN | SYSTOLIC BLOOD PRESSURE: 122 MMHG | TEMPERATURE: 98 F

## 2020-10-29 DIAGNOSIS — R23.4 FISSURE IN SKIN: ICD-10-CM

## 2020-10-29 DIAGNOSIS — M54.31 BILATERAL SCIATICA: ICD-10-CM

## 2020-10-29 DIAGNOSIS — M54.32 BILATERAL SCIATICA: ICD-10-CM

## 2020-10-29 DIAGNOSIS — Z00.00 ROUTINE GENERAL MEDICAL EXAMINATION AT A HEALTH CARE FACILITY: Primary | ICD-10-CM

## 2020-10-29 DIAGNOSIS — I10 ESSENTIAL HYPERTENSION, BENIGN: ICD-10-CM

## 2020-10-29 PROBLEM — M54.50 LOW BACK PAIN, UNSPECIFIED BACK PAIN LATERALITY, UNSPECIFIED CHRONICITY, UNSPECIFIED WHETHER SCIATICA PRESENT: Status: RESOLVED | Noted: 2020-03-05 | Resolved: 2020-10-29

## 2020-10-29 PROCEDURE — 3079F DIAST BP 80-89 MM HG: CPT | Performed by: FAMILY MEDICINE

## 2020-10-29 PROCEDURE — 99396 PREV VISIT EST AGE 40-64: CPT | Performed by: FAMILY MEDICINE

## 2020-10-29 PROCEDURE — 3008F BODY MASS INDEX DOCD: CPT | Performed by: FAMILY MEDICINE

## 2020-10-29 PROCEDURE — 3074F SYST BP LT 130 MM HG: CPT | Performed by: FAMILY MEDICINE

## 2020-10-29 RX ORDER — GABAPENTIN 300 MG/1
300 CAPSULE ORAL 4 TIMES DAILY
Qty: 360 CAPSULE | Refills: 3 | Status: SHIPPED | OUTPATIENT
Start: 2020-10-29

## 2020-10-29 NOTE — PROGRESS NOTES
HPI:  Here for a physical.  Blood pressure has been under good control. Always in the 120s or 130s over 76s or 80s. Follows with Dr. Tana Salcedo for lupus. Had back surgery this spring. Shoulders are still the problematic joints.     She has fissures in the OTHER SURGICAL HISTORY  2007    fattty tumor removal, left flank   • TUBAL LIGATION       MEDICATIONS:  Current Outpatient Medications   Medication Sig Dispense Refill   • gabapentin 300 MG Oral Cap Take 1 capsule (300 mg total) by mouth 4 (four) times alejandra Marital status:       Spouse name: Not on file      Number of children: Not on file      Years of education: Not on file      Highest education level: Not on file    Occupational History      Not on file    Social Needs      Financial resource str hearing acuity. CARDIOVASCULAR: No complaints of chest pain with exertion, no PND, orthopnea, or edema. No decrease in exercise tolerance. PULMONARY: No complaints of  extreme shortness of breath with activity. No complaints of  wheezing.  No complaints o nondistended, NABS x 4 quadrants. No HSM; no masses; no bruits. LYMPHATIC: no lymphadenopathy palpated about the anterior and posterior cervical chains, submandibular and supraclavicular areas, axilla, and inguinal areas.   EXTREMITIES / MUSCULOSKELETAL: 4

## 2020-10-29 NOTE — PATIENT INSTRUCTIONS
You should consider the shingles vaccine, Shingrix. Even though you had this once it may come again. This is 2 doses between 2 and 6 months apart    You are due for a tetanus diphtheria and pertussis vaccine.   Tetanus is deadly if you are exposed through

## 2020-10-31 DIAGNOSIS — S39.012A BACK STRAIN, INITIAL ENCOUNTER: ICD-10-CM

## 2020-11-02 RX ORDER — PREDNISONE 20 MG/1
TABLET ORAL
Qty: 10 TABLET | Refills: 0 | Status: SHIPPED | OUTPATIENT
Start: 2020-11-02 | End: 2021-06-17 | Stop reason: ALTCHOICE

## 2020-11-02 RX ORDER — LOSARTAN POTASSIUM AND HYDROCHLOROTHIAZIDE 12.5; 1 MG/1; MG/1
TABLET ORAL
Qty: 90 TABLET | Refills: 0 | Status: SHIPPED | OUTPATIENT
Start: 2020-11-02 | End: 2021-05-24

## 2020-11-02 RX ORDER — MOMETASONE FUROATE 1 MG/ML
SOLUTION TOPICAL
Qty: 60 ML | Refills: 0 | Status: SHIPPED | OUTPATIENT
Start: 2020-11-02 | End: 2021-06-17 | Stop reason: ALTCHOICE

## 2020-11-02 NOTE — TELEPHONE ENCOUNTER
Rx Request  LOSARTAN POTASSIUM-HCTZ 100-12.5 MG Oral Tab    Disp:       90             R: 0    Last Refilled: 10/29/2020    Last Visit: 09/10/2020    Protocol Passed?  Yes[ x ]       No[  ]

## 2020-11-02 NOTE — TELEPHONE ENCOUNTER
Rx Request  MOMETASONE FUROATE 0.1 % External Solution    Disp:     60 mL               R: 0    Last Refilled: 07/13/2020     Last Visit: 07/13/2020

## 2020-12-07 ENCOUNTER — PATIENT MESSAGE (OUTPATIENT)
Dept: FAMILY MEDICINE CLINIC | Facility: CLINIC | Age: 55
End: 2020-12-07

## 2020-12-07 DIAGNOSIS — IMO0002 CHRONIC MIGRAINE: ICD-10-CM

## 2020-12-07 DIAGNOSIS — M54.16 LUMBAR RADICULAR PAIN: ICD-10-CM

## 2020-12-07 DIAGNOSIS — M79.672 LEFT FOOT PAIN: ICD-10-CM

## 2020-12-07 DIAGNOSIS — G44.209 TENSION HEADACHE: ICD-10-CM

## 2020-12-08 RX ORDER — BUTALBITAL, ACETAMINOPHEN AND CAFFEINE 50; 325; 40 MG/1; MG/1; MG/1
1-2 TABLET ORAL EVERY 4 HOURS PRN
Qty: 20 TABLET | Refills: 0 | Status: SHIPPED | OUTPATIENT
Start: 2020-12-08 | End: 2021-05-21

## 2020-12-08 NOTE — TELEPHONE ENCOUNTER
Dr. Huey Perdomo, please see message from pt and and approve/deny rx.   Last refill in 7/2019 last ov 10/2020

## 2020-12-08 NOTE — TELEPHONE ENCOUNTER
From: Jes Zhong  To: Benito Kearney MD  Sent: 12/7/2020 6:45 PM CST  Subject: Prescription Question    Dr Darío Stubbs,    Please submit a refill for But/Acetaminophen/Caf -40 mg tablets.   This was prescribed a few years ago by Dr. Lilia Camacho and I have

## 2021-01-05 RX ORDER — ALBUTEROL SULFATE 90 UG/1
2 AEROSOL, METERED RESPIRATORY (INHALATION) EVERY 4 HOURS PRN
Qty: 1 INHALER | Refills: 0 | Status: SHIPPED | OUTPATIENT
Start: 2021-01-05 | End: 2021-10-18

## 2021-01-05 NOTE — TELEPHONE ENCOUNTER
Patient calling in regards to script request for Ventolin. She states that she only uses it sparingly and wants to know if doctor will reconsider filling it. Please advise.

## 2021-04-08 DIAGNOSIS — L30.9 DERMATITIS: ICD-10-CM

## 2021-04-08 RX ORDER — KETOCONAZOLE 20 MG/ML
SHAMPOO TOPICAL
Qty: 120 ML | Refills: 0 | OUTPATIENT
Start: 2021-04-08

## 2021-04-22 ENCOUNTER — OFFICE VISIT (OUTPATIENT)
Dept: FAMILY MEDICINE CLINIC | Facility: CLINIC | Age: 56
End: 2021-04-22
Payer: COMMERCIAL

## 2021-04-22 VITALS
HEART RATE: 80 BPM | WEIGHT: 177 LBS | DIASTOLIC BLOOD PRESSURE: 81 MMHG | SYSTOLIC BLOOD PRESSURE: 131 MMHG | BODY MASS INDEX: 34.75 KG/M2 | OXYGEN SATURATION: 96 % | HEIGHT: 60 IN | RESPIRATION RATE: 18 BRPM

## 2021-04-22 DIAGNOSIS — L30.9 DERMATITIS: ICD-10-CM

## 2021-04-22 DIAGNOSIS — Z12.31 ENCOUNTER FOR SCREENING MAMMOGRAM FOR MALIGNANT NEOPLASM OF BREAST: ICD-10-CM

## 2021-04-22 DIAGNOSIS — Z00.00 ROUTINE GENERAL MEDICAL EXAMINATION AT A HEALTH CARE FACILITY: Primary | ICD-10-CM

## 2021-04-22 PROBLEM — M67.80 TENDONOSIS: Status: RESOLVED | Noted: 2019-02-06 | Resolved: 2021-04-22

## 2021-04-22 PROCEDURE — 3079F DIAST BP 80-89 MM HG: CPT | Performed by: FAMILY MEDICINE

## 2021-04-22 PROCEDURE — 3075F SYST BP GE 130 - 139MM HG: CPT | Performed by: FAMILY MEDICINE

## 2021-04-22 PROCEDURE — 3008F BODY MASS INDEX DOCD: CPT | Performed by: FAMILY MEDICINE

## 2021-04-22 PROCEDURE — 99396 PREV VISIT EST AGE 40-64: CPT | Performed by: FAMILY MEDICINE

## 2021-04-22 RX ORDER — KETOCONAZOLE 20 MG/ML
5 SHAMPOO TOPICAL
Qty: 120 ML | Refills: 3 | Status: SHIPPED | OUTPATIENT
Start: 2021-04-22

## 2021-04-22 NOTE — PROGRESS NOTES
HPI:  Here for a physical.    PAST MEDICAL HISTORY:  Past Medical History:   Diagnosis Date   • ANEMIA    • Brachial neuritis or radiculitis NOS    • HEADACHES    • HYPERTENSION    • IBS (irritable bowel syndrome)    • Migraine, unspecified, without mentio • OTHER SURGICAL HISTORY  2008    uterine ablation   • OTHER SURGICAL HISTORY  2007    fattty tumor removal, left flank   • TUBAL LIGATION       MEDICATIONS:  Current Outpatient Medications   Medication Sig Dispense Refill   • Ketoconazole 2 % External S (TWO) TIMES DAILY. 50 mL 2   • betamethasone dipropionate 0.05 % External Cream Apply 1 Application topically 2 (two) times daily.  1 Tube 0     ALLERGIES: Cellcept [Mycophenolate]    Family History   Problem Relation Age of Onset   • Diabetes Mother    • H Services:       Active Member of Clubs or Organizations:       Attends Club or Organization Meetings:       Marital Status:   Intimate Partner Violence:       Fear of Current or Ex-Partner:       Emotionally Abused:       Physically Abused:       Sexually noted. PULMONARY: CTA bilaterally, good air exchange, no rhonchi, wheezes, or rales. No dullness to percussion. ABDOMEN: Soft, nontender, nondistended, NABS x 4 quadrants. No HSM; no masses; no bruits.   LYMPHATIC: no lymphadenopathy palpated about the an

## 2021-05-20 DIAGNOSIS — M79.672 LEFT FOOT PAIN: ICD-10-CM

## 2021-05-20 DIAGNOSIS — G44.209 TENSION HEADACHE: ICD-10-CM

## 2021-05-20 DIAGNOSIS — M54.16 LUMBAR RADICULAR PAIN: ICD-10-CM

## 2021-05-20 DIAGNOSIS — IMO0002 CHRONIC MIGRAINE: ICD-10-CM

## 2021-05-21 RX ORDER — BUTALBITAL, ACETAMINOPHEN AND CAFFEINE 50; 325; 40 MG/1; MG/1; MG/1
TABLET ORAL
Qty: 20 TABLET | Refills: 0 | Status: SHIPPED | OUTPATIENT
Start: 2021-05-21 | End: 2021-12-21

## 2021-05-24 ENCOUNTER — TELEPHONE (OUTPATIENT)
Dept: FAMILY MEDICINE CLINIC | Facility: CLINIC | Age: 56
End: 2021-05-24

## 2021-05-24 RX ORDER — AMLODIPINE BESYLATE 5 MG/1
5 TABLET ORAL DAILY
Qty: 90 TABLET | Refills: 1 | Status: SHIPPED | OUTPATIENT
Start: 2021-05-24 | End: 2021-12-21

## 2021-05-24 RX ORDER — LOSARTAN POTASSIUM AND HYDROCHLOROTHIAZIDE 12.5; 1 MG/1; MG/1
1 TABLET ORAL DAILY
Qty: 90 TABLET | Refills: 1 | Status: SHIPPED | OUTPATIENT
Start: 2021-05-24 | End: 2021-11-10

## 2021-05-24 NOTE — TELEPHONE ENCOUNTER
Patient requesting refillm ofnAmlodipine and Losartan-hydrochlorothiazide. Had px 4/22/21, labs ordered. Refills given.

## 2021-06-17 ENCOUNTER — TELEMEDICINE (OUTPATIENT)
Dept: FAMILY MEDICINE CLINIC | Facility: CLINIC | Age: 56
End: 2021-06-17
Payer: COMMERCIAL

## 2021-06-17 ENCOUNTER — PATIENT MESSAGE (OUTPATIENT)
Dept: FAMILY MEDICINE CLINIC | Facility: CLINIC | Age: 56
End: 2021-06-17

## 2021-06-17 DIAGNOSIS — L30.9 DERMATITIS: ICD-10-CM

## 2021-06-17 DIAGNOSIS — J18.9 PNEUMONIA DUE TO INFECTIOUS ORGANISM, UNSPECIFIED LATERALITY, UNSPECIFIED PART OF LUNG: Primary | ICD-10-CM

## 2021-06-17 PROCEDURE — 99214 OFFICE O/P EST MOD 30 MIN: CPT | Performed by: FAMILY MEDICINE

## 2021-06-17 RX ORDER — BETAMETHASONE DIPROPIONATE 0.5 MG/G
1 CREAM TOPICAL 2 TIMES DAILY
Qty: 45 G | Refills: 1 | Status: SHIPPED | OUTPATIENT
Start: 2021-06-17

## 2021-06-17 RX ORDER — BENZONATATE 100 MG/1
100 CAPSULE ORAL 3 TIMES DAILY PRN
Qty: 20 CAPSULE | Refills: 0 | Status: SHIPPED | OUTPATIENT
Start: 2021-06-17 | End: 2021-11-09

## 2021-06-17 RX ORDER — AZITHROMYCIN 250 MG/1
TABLET, FILM COATED ORAL
Qty: 6 TABLET | Refills: 0 | Status: SHIPPED | OUTPATIENT
Start: 2021-06-17 | End: 2021-06-22

## 2021-06-17 NOTE — PROGRESS NOTES
Virtual video Check-In    Jami Cross verbally consents to a Virtual/Telephone Check-In visit on 06/17/21. Patient understands and accepts financial responsibility for any deductible, co-insurance and/or co-pays associated with this service.     [facility]   • FLUOR GID & Devoria Pendleton NDL/CATH SPI DX/THER NJX  11/11/2015    Procedure: ;  Surgeon: Litzy Soto MD;  Location: [facility]   • INJECTION, W/WO CONTRAST, DX/THERAPEUTIC SUBSTANCE, EPIDURAL/SUBARACHNOID; CE 90 tablet 1   • BUTALBITAL-APAP-CAFFEINE -40 MG Oral Tab take 1-2 tablets by mouth every 4 hours as needed for pain 20 tablet 0   • Ketoconazole 2 % External Shampoo Apply 5 mL topically twice a week.  120 mL 3   • Albuterol Sulfate HFA (VENTOLIN HFA) complete, we may want to consider alternative therapies. 2. Dermatitis    - betamethasone dipropionate 0.05 % External Cream; Apply 1 Application topically 2 (two) times daily. For affected areas not on face  Dispense: 45 g; Refill: 1  - hydrocortisone 2.

## 2021-06-18 NOTE — TELEPHONE ENCOUNTER
From: Christopher Durham  To: Jessenia Sorto MD  Sent: 6/17/2021 5:28 PM CDT  Subject: Visit Follow-up Question    Dr. Eh English,    Please provide a note concerning my condition and diagnoses. I would like to have available to provide to management.      Thanks

## 2021-06-28 ENCOUNTER — PATIENT MESSAGE (OUTPATIENT)
Dept: FAMILY MEDICINE CLINIC | Facility: CLINIC | Age: 56
End: 2021-06-28

## 2021-06-29 NOTE — TELEPHONE ENCOUNTER
Pt is feeling better but dry cough is persistent. Do you want her to dc the losartan? Or wait a little longer since she was diagnosed with Pneumonia recently?

## 2021-08-03 DIAGNOSIS — N63.20 BREAST MASS, LEFT: Primary | ICD-10-CM

## 2021-08-19 ENCOUNTER — OFFICE VISIT (OUTPATIENT)
Dept: SURGERY | Facility: CLINIC | Age: 56
End: 2021-08-19
Payer: COMMERCIAL

## 2021-08-19 VITALS
HEART RATE: 90 BPM | WEIGHT: 177 LBS | BODY MASS INDEX: 34.75 KG/M2 | TEMPERATURE: 97 F | SYSTOLIC BLOOD PRESSURE: 148 MMHG | HEIGHT: 60 IN | DIASTOLIC BLOOD PRESSURE: 91 MMHG

## 2021-08-19 DIAGNOSIS — R59.0 LYMPHADENOPATHY, AXILLARY: ICD-10-CM

## 2021-08-19 DIAGNOSIS — R22.32 AXILLARY MASS, LEFT: Primary | ICD-10-CM

## 2021-08-19 PROCEDURE — 99245 OFF/OP CONSLTJ NEW/EST HI 55: CPT | Performed by: SURGERY

## 2021-08-19 PROCEDURE — 3077F SYST BP >= 140 MM HG: CPT | Performed by: SURGERY

## 2021-08-19 PROCEDURE — 3080F DIAST BP >= 90 MM HG: CPT | Performed by: SURGERY

## 2021-08-19 PROCEDURE — 3008F BODY MASS INDEX DOCD: CPT | Performed by: SURGERY

## 2021-08-21 NOTE — H&P
New Patient Visit Note       Active Problems      1. Axillary mass, left    2. Lymphadenopathy, axillary        Chief Complaint   Patient presents with:  Abnormal Mammogram: NP - ref by PCP for abnormal mamm from 7/29.  Enlarged LT lymph node, US results in CERVICAL EPIDURAL;  Surgeon: Sushil Clayton MD;  Location: [address] [place] [address] M[facility]SED[facility] [id number] [facility]  N SVC 5+ YR N/A 10/14/2015    Procedure: CERVICAL EPIDURAL;  Surgeon: Sushil Clayton MD;  Location: [address] [place] [address] PAIN MANAGEMENT   • mouth 2 (two) times daily. , Disp: 60 tablet, Rfl: 3  •  tiZANidine HCl 4 MG Oral Tab, Take 1 tablet (4 mg total) by mouth every 6 (six) hours as needed. , Disp: 30 tablet, Rfl: 0      Review of Systems  The Review of Systems has been reviewed by me during t General: Bowel sounds are normal. There is no distension. Palpations: Abdomen is soft. Tenderness: There is no abdominal tenderness. Musculoskeletal:         General: No deformity. Normal range of motion.    Skin:     General: Skin is warm and d microcalcifications or areas of architectural distortion are noted. Prominent left axillary lymph node was noted.   Ultrasound of the left axilla revealed multiple unremarkable left axillary lymph nodes including a prominent 3.2 cm node with preservation o AXILLARY LEFT SH(CPT=76882)    Follow Up  No follow-ups on file.     Catherine Cedillo MD

## 2021-09-10 LAB
CHOL/HDLC RATIO: 2.6 (CALC)
CHOLESTEROL, TOTAL: 193 MG/DL
HDL CHOLESTEROL: 75 MG/DL
LDL-CHOLESTEROL: 91 MG/DL (CALC)
NON-HDL CHOLESTEROL: 118 MG/DL (CALC)
TRIGLYCERIDES: 170 MG/DL

## 2021-09-28 LAB
ALBUMIN SERPL-MCNC: 4.3 G/DL (ref 3.6–5.1)
ALBUMIN/GLOB SERPL: 1.2 (CALC) (ref 1–2.5)
ALP SERPL-CCNC: 81 U/L (ref 37–153)
ALT SERPL-CCNC: 19 U/L (ref 6–29)
ANA SER QL IF: NEGATIVE
AST SERPL-CCNC: 20 U/L (ref 10–35)
BASOPHILS # BLD AUTO: 28 CELLS/UL (ref 0–200)
BASOPHILS NFR BLD AUTO: 0.6 %
BILIRUB SERPL-MCNC: 0.5 MG/DL (ref 0.2–1.2)
BLASTS # BLD: ABNORMAL CELLS/UL
BLASTS NFR BLD MANUAL: ABNORMAL %
BUN SERPL-MCNC: 17 MG/DL (ref 7–25)
BUN/CREAT SERPL: ABNORMAL (CALC) (ref 6–22)
C3 SERPL-MCNC: 187 MG/DL (ref 83–193)
C4 SERPL-MCNC: 44 MG/DL (ref 15–57)
CALCIUM SERPL-MCNC: 10.3 MG/DL (ref 8.6–10.4)
CHLORIDE SERPL-SCNC: 105 MMOL/L (ref 98–110)
CO2 SERPL-SCNC: 30 MMOL/L (ref 20–32)
CREAT SERPL-MCNC: 0.88 MG/DL (ref 0.5–1.05)
CREAT UR-MCNC: 394 MG/DL (ref 20–275)
CRP SERPL-MCNC: 2.5 MG/L
EOSINOPHIL # BLD AUTO: 216 CELLS/UL (ref 15–500)
EOSINOPHIL NFR BLD AUTO: 4.7 %
ERYTHROCYTE [DISTWIDTH] IN BLOOD BY AUTOMATED COUNT: 14.6 % (ref 11–15)
GLOBULIN SER CALC-MCNC: 3.5 G/DL (CALC) (ref 1.9–3.7)
GLUCOSE SERPL-MCNC: 106 MG/DL (ref 65–99)
HCT VFR BLD AUTO: 38.3 % (ref 35–45)
HGB BLD-MCNC: 11.8 G/DL (ref 11.7–15.5)
LYMPHOCYTES # BLD AUTO: 1743 CELLS/UL (ref 850–3900)
LYMPHOCYTES NFR BLD AUTO: 37.9 %
MCH RBC QN AUTO: 22.8 PG (ref 27–33)
MCHC RBC AUTO-ENTMCNC: 30.8 G/DL (ref 32–36)
MCV RBC AUTO: 73.9 FL (ref 80–100)
METAMYELOCYTES # BLD: ABNORMAL CELLS/UL
METAMYELOCYTES NFR BLD MANUAL: ABNORMAL %
MONOCYTES # BLD AUTO: 584 CELLS/UL (ref 200–950)
MONOCYTES NFR BLD AUTO: 12.7 %
MYELOCYTES # BLD: ABNORMAL CELLS/UL
MYELOCYTES NFR BLD MANUAL: ABNORMAL %
NEUTROPHILS # BLD AUTO: 2029 CELLS/UL (ref 1500–7800)
NEUTROPHILS NFR BLD AUTO: 44.1 %
NEUTS BAND # BLD: ABNORMAL CELLS/UL (ref 0–750)
NEUTS BAND NFR BLD MANUAL: ABNORMAL %
NRBC # BLD: ABNORMAL CELLS/UL
NRBC BLD-RTO: ABNORMAL /100 WBC
PLATELET # BLD AUTO: 290 THOUSAND/UL (ref 140–400)
PMV BLD REES-ECKER: 9.5 FL (ref 7.5–12.5)
POTASSIUM SERPL-SCNC: 4.2 MMOL/L (ref 3.5–5.3)
PROMYELOCYTES # BLD: ABNORMAL CELLS/UL
PROMYELOCYTES NFR BLD MANUAL: ABNORMAL %
PROT SERPL-MCNC: 7.8 G/DL (ref 6.1–8.1)
PROT UR-MCNC: 19 MG/DL (ref 5–24)
PROT/CREAT UR: 0.05 MG/MG CREAT (ref 0.02–0.16)
PROT/CREAT UR: 48 MG/G CREAT (ref 21–161)
RBC # BLD AUTO: 5.18 MILLION/UL (ref 3.8–5.1)
SERVICE CMNT-IMP: ABNORMAL
SODIUM SERPL-SCNC: 142 MMOL/L (ref 135–146)
VARIANT LYMPHS NFR BLD: ABNORMAL % (ref 0–10)
WBC # BLD AUTO: 4.6 THOUSAND/UL (ref 3.8–10.8)

## 2021-10-15 ENCOUNTER — PATIENT MESSAGE (OUTPATIENT)
Dept: FAMILY MEDICINE CLINIC | Facility: CLINIC | Age: 56
End: 2021-10-15

## 2021-10-18 RX ORDER — ALBUTEROL SULFATE 90 UG/1
2 AEROSOL, METERED RESPIRATORY (INHALATION) EVERY 4 HOURS PRN
Qty: 1 EACH | Refills: 0 | Status: SHIPPED | OUTPATIENT
Start: 2021-10-18 | End: 2021-11-11

## 2021-10-18 NOTE — TELEPHONE ENCOUNTER
From: Thaddeus Pittman  To: Natalie Sharma MD  Sent: 10/15/2021 12:33 PM CDT  Subject: Albuterol Inhaler    Dr. Suki Parks,    I believe you prescribed an inhaler for me in the past to help with my bad cough and congestion.    My pharmacy was unable to find or

## 2021-11-09 ENCOUNTER — TELEMEDICINE (OUTPATIENT)
Dept: FAMILY MEDICINE CLINIC | Facility: CLINIC | Age: 56
End: 2021-11-09

## 2021-11-09 DIAGNOSIS — J98.01 BRONCHOSPASM: ICD-10-CM

## 2021-11-09 DIAGNOSIS — R05.9 COUGH: Primary | ICD-10-CM

## 2021-11-09 PROCEDURE — 99213 OFFICE O/P EST LOW 20 MIN: CPT | Performed by: FAMILY MEDICINE

## 2021-11-09 RX ORDER — AZITHROMYCIN 250 MG/1
TABLET, FILM COATED ORAL
Qty: 6 TABLET | Refills: 0 | Status: SHIPPED | OUTPATIENT
Start: 2021-11-09 | End: 2021-11-14

## 2021-11-09 RX ORDER — CODEINE PHOSPHATE AND GUAIFENESIN 10; 100 MG/5ML; MG/5ML
5 SOLUTION ORAL EVERY 6 HOURS PRN
Qty: 200 ML | Refills: 0 | Status: SHIPPED | OUTPATIENT
Start: 2021-11-09 | End: 2022-02-07

## 2021-11-09 RX ORDER — BENZONATATE 200 MG/1
200 CAPSULE ORAL EVERY 8 HOURS PRN
Qty: 30 CAPSULE | Refills: 0 | Status: SHIPPED | OUTPATIENT
Start: 2021-11-09 | End: 2022-02-07

## 2021-11-09 RX ORDER — FLUTICASONE PROPIONATE 50 MCG
2 SPRAY, SUSPENSION (ML) NASAL DAILY
Qty: 1 EACH | Refills: 1 | Status: SHIPPED | OUTPATIENT
Start: 2021-11-09

## 2021-11-10 RX ORDER — LOSARTAN POTASSIUM AND HYDROCHLOROTHIAZIDE 12.5; 1 MG/1; MG/1
1 TABLET ORAL DAILY
Qty: 90 TABLET | Refills: 0 | Status: SHIPPED | OUTPATIENT
Start: 2021-11-10 | End: 2021-12-21

## 2021-11-10 NOTE — PROGRESS NOTES
Subjective:   Patient ID: Gonzalo Ford is a 60year old female. Cough x 3-4 weeks. No fevers, chills, or sweats. + mild SOB. Deep breath causing cough. No CP. No sputum. Cough is driving her nuts and making it hard for her to sleep.       History/ times daily. 60 tablet 3   • tiZANidine HCl 4 MG Oral Tab Take 1 tablet (4 mg total) by mouth every 6 (six) hours as needed.  30 tablet 0     Allergies:  Cellcept [Mycopheno*    RASH    Objective:   Physical Exam  Video visit    Assessment & Plan:   Cough Take 1 capsule (200 mg total) by mouth every 8 (eight) hours as needed for cough. • guaiFENesin-codeine 100-10 MG/5ML Oral Solution 200 mL 0     Sig: Take 5 mL by mouth every 6 (six) hours as needed for cough.        Imaging & Referrals:  None

## 2021-12-21 DIAGNOSIS — M79.672 LEFT FOOT PAIN: ICD-10-CM

## 2021-12-21 DIAGNOSIS — M54.16 LUMBAR RADICULAR PAIN: ICD-10-CM

## 2021-12-21 DIAGNOSIS — G44.209 TENSION HEADACHE: ICD-10-CM

## 2021-12-21 RX ORDER — LOSARTAN POTASSIUM AND HYDROCHLOROTHIAZIDE 12.5; 1 MG/1; MG/1
TABLET ORAL
Qty: 90 TABLET | Refills: 0 | Status: SHIPPED | OUTPATIENT
Start: 2021-12-21

## 2021-12-21 RX ORDER — BUTALBITAL, ACETAMINOPHEN AND CAFFEINE 50; 325; 40 MG/1; MG/1; MG/1
TABLET ORAL
Qty: 20 TABLET | Refills: 0 | Status: SHIPPED | OUTPATIENT
Start: 2021-12-21 | End: 2022-01-13

## 2021-12-21 RX ORDER — AMLODIPINE BESYLATE 5 MG/1
TABLET ORAL
Qty: 90 TABLET | Refills: 0 | Status: SHIPPED | OUTPATIENT
Start: 2021-12-21

## 2021-12-21 NOTE — TELEPHONE ENCOUNTER
LV:11/9/2021 TELEMED  LR:BUTALBITAL 5/21/2021  AMLODIPINE 5/24/2021  LOSARTAN 11/10/2021  CMP:7/22/2021

## 2022-02-07 ENCOUNTER — TELEMEDICINE (OUTPATIENT)
Dept: FAMILY MEDICINE CLINIC | Facility: CLINIC | Age: 57
End: 2022-02-07
Payer: COMMERCIAL

## 2022-02-07 DIAGNOSIS — J98.01 BRONCHOSPASM: Primary | ICD-10-CM

## 2022-02-07 DIAGNOSIS — R05.3 COUGH, PERSISTENT: ICD-10-CM

## 2022-02-07 PROCEDURE — 99213 OFFICE O/P EST LOW 20 MIN: CPT | Performed by: PHYSICIAN ASSISTANT

## 2022-02-07 RX ORDER — BENZONATATE 200 MG/1
200 CAPSULE ORAL 3 TIMES DAILY PRN
Qty: 30 CAPSULE | Refills: 0 | Status: SHIPPED | OUTPATIENT
Start: 2022-02-07

## 2022-02-07 RX ORDER — CODEINE PHOSPHATE AND GUAIFENESIN 10; 100 MG/5ML; MG/5ML
5 SOLUTION ORAL EVERY 6 HOURS PRN
Qty: 236 ML | Refills: 0 | Status: SHIPPED | OUTPATIENT
Start: 2022-02-07

## 2022-02-07 RX ORDER — FLUTICASONE PROPIONATE AND SALMETEROL 100; 50 UG/1; UG/1
1 POWDER RESPIRATORY (INHALATION) 2 TIMES DAILY
Qty: 1 EACH | Refills: 0 | Status: SHIPPED | OUTPATIENT
Start: 2022-02-07

## 2022-02-07 RX ORDER — AZITHROMYCIN 250 MG/1
TABLET, FILM COATED ORAL
Qty: 6 TABLET | Refills: 0 | Status: SHIPPED | OUTPATIENT
Start: 2022-02-07 | End: 2022-02-12

## 2022-04-25 RX ORDER — LOSARTAN POTASSIUM AND HYDROCHLOROTHIAZIDE 12.5; 1 MG/1; MG/1
TABLET ORAL
Qty: 90 TABLET | Refills: 0 | Status: SHIPPED | OUTPATIENT
Start: 2022-04-25

## 2022-04-25 RX ORDER — AMLODIPINE BESYLATE 5 MG/1
TABLET ORAL
Qty: 90 TABLET | Refills: 0 | Status: SHIPPED | OUTPATIENT
Start: 2022-04-25

## 2022-05-03 RX ORDER — CLOBETASOL PROPIONATE 0.46 MG/ML
SOLUTION TOPICAL
Qty: 50 ML | Refills: 0 | OUTPATIENT
Start: 2022-05-03

## 2022-05-11 RX ORDER — KETOCONAZOLE 20 MG/ML
SHAMPOO TOPICAL
Qty: 120 ML | Refills: 0 | Status: SHIPPED | OUTPATIENT
Start: 2022-05-11

## 2022-05-11 RX ORDER — BETAMETHASONE DIPROPIONATE 0.5 MG/G
CREAM TOPICAL
Qty: 45 G | Refills: 0 | Status: SHIPPED | OUTPATIENT
Start: 2022-05-11

## 2022-06-22 ENCOUNTER — TELEPHONE (OUTPATIENT)
Dept: FAMILY MEDICINE CLINIC | Facility: CLINIC | Age: 57
End: 2022-06-22

## 2022-06-22 NOTE — TELEPHONE ENCOUNTER
Received positive Covid result June 18th. Looking to see if Dr. Gaston Sheth will prescribe Covid pill.

## 2022-06-22 NOTE — TELEPHONE ENCOUNTER
Spoke with pt she states she tested positive for Covid Saturday 6/18/22. Symptoms started 6/18/22. Congestion and cough. Pt would like Paxlovid. Instructed pt Paxlovid needs to be started within 5 days of sx starting. Instructed pt we have no appt availability today. Pt states she will go to immediate care.

## 2022-07-12 ENCOUNTER — TELEMEDICINE (OUTPATIENT)
Dept: FAMILY MEDICINE CLINIC | Facility: CLINIC | Age: 57
End: 2022-07-12
Payer: COMMERCIAL

## 2022-07-12 DIAGNOSIS — J40 BRONCHITIS: Primary | ICD-10-CM

## 2022-07-12 PROCEDURE — 99213 OFFICE O/P EST LOW 20 MIN: CPT | Performed by: INTERNAL MEDICINE

## 2022-07-12 RX ORDER — HYDROCODONE POLISTIREX AND CHLORPHENIRAMINE POLISTIREX 10; 8 MG/5ML; MG/5ML
5 SUSPENSION, EXTENDED RELEASE ORAL 2 TIMES DAILY PRN
Qty: 70 ML | Refills: 0 | Status: SHIPPED | OUTPATIENT
Start: 2022-07-12 | End: 2022-07-19

## 2022-07-12 RX ORDER — ALBUTEROL SULFATE 90 UG/1
2 AEROSOL, METERED RESPIRATORY (INHALATION) EVERY 4 HOURS PRN
Qty: 1 EACH | Refills: 0 | Status: SHIPPED | OUTPATIENT
Start: 2022-07-12

## 2022-07-12 RX ORDER — PREDNISONE 20 MG/1
TABLET ORAL
Qty: 8 TABLET | Refills: 0 | Status: SHIPPED | OUTPATIENT
Start: 2022-07-12

## 2022-07-21 RX ORDER — AMLODIPINE BESYLATE 5 MG/1
TABLET ORAL
Qty: 90 TABLET | Refills: 0 | Status: SHIPPED | OUTPATIENT
Start: 2022-07-21

## 2022-07-26 NOTE — TELEPHONE ENCOUNTER
From: Christopher Durham  To: Jessenia Sorto MD  Sent: 6/28/2021 8:35 PM CDT  Subject: Non-Urgent Medical Question    Dr. Eh English,  I am feeling better; however, the cough persist to the point I get headaches. Should I discontinue the Losartan?     Thanks, English

## 2022-08-02 ENCOUNTER — OFFICE VISIT (OUTPATIENT)
Dept: FAMILY MEDICINE CLINIC | Facility: CLINIC | Age: 57
End: 2022-08-02
Payer: COMMERCIAL

## 2022-08-02 VITALS
HEART RATE: 70 BPM | DIASTOLIC BLOOD PRESSURE: 60 MMHG | BODY MASS INDEX: 33.91 KG/M2 | RESPIRATION RATE: 16 BRPM | WEIGHT: 179.63 LBS | HEIGHT: 61 IN | SYSTOLIC BLOOD PRESSURE: 122 MMHG | OXYGEN SATURATION: 100 %

## 2022-08-02 DIAGNOSIS — M25.50 POST-COVID CHRONIC JOINT PAIN: ICD-10-CM

## 2022-08-02 DIAGNOSIS — Z00.00 ROUTINE GENERAL MEDICAL EXAMINATION AT A HEALTH CARE FACILITY: Primary | ICD-10-CM

## 2022-08-02 DIAGNOSIS — Z12.31 BREAST CANCER SCREENING BY MAMMOGRAM: ICD-10-CM

## 2022-08-02 DIAGNOSIS — G89.29 POST-COVID CHRONIC JOINT PAIN: ICD-10-CM

## 2022-08-02 DIAGNOSIS — U09.9 POST-COVID CHRONIC JOINT PAIN: ICD-10-CM

## 2022-08-02 PROBLEM — J44.9 CHRONIC OBSTRUCTIVE PULMONARY DISEASE, UNSPECIFIED (HCC): Status: ACTIVE | Noted: 2022-08-02

## 2022-08-02 PROCEDURE — 3008F BODY MASS INDEX DOCD: CPT | Performed by: FAMILY MEDICINE

## 2022-08-02 PROCEDURE — 3074F SYST BP LT 130 MM HG: CPT | Performed by: FAMILY MEDICINE

## 2022-08-02 PROCEDURE — 99396 PREV VISIT EST AGE 40-64: CPT | Performed by: FAMILY MEDICINE

## 2022-08-02 PROCEDURE — 3078F DIAST BP <80 MM HG: CPT | Performed by: FAMILY MEDICINE

## 2022-08-02 RX ORDER — HYDROXYCHLOROQUINE SULFATE 200 MG/1
TABLET, FILM COATED ORAL
COMMUNITY
Start: 2022-07-02

## 2022-08-02 RX ORDER — TRAMADOL HYDROCHLORIDE 50 MG/1
50 TABLET ORAL EVERY 8 HOURS PRN
Qty: 90 TABLET | Refills: 0 | Status: SHIPPED | OUTPATIENT
Start: 2022-08-02

## 2022-08-04 DIAGNOSIS — L30.9 DERMATITIS: ICD-10-CM

## 2022-08-04 RX ORDER — KETOCONAZOLE 20 MG/ML
SHAMPOO TOPICAL
Qty: 120 ML | Refills: 0 | Status: SHIPPED | OUTPATIENT
Start: 2022-08-04

## 2022-08-08 DIAGNOSIS — L30.9 DERMATITIS: ICD-10-CM

## 2022-08-08 RX ORDER — BETAMETHASONE DIPROPIONATE 0.5 MG/G
CREAM TOPICAL
Qty: 45 G | Refills: 0 | Status: SHIPPED | OUTPATIENT
Start: 2022-08-08

## 2022-08-09 DIAGNOSIS — L30.9 DERMATITIS: ICD-10-CM

## 2022-08-11 ENCOUNTER — HOSPITAL ENCOUNTER (OUTPATIENT)
Dept: MAMMOGRAPHY | Age: 57
Discharge: HOME | End: 2022-08-11
Attending: FAMILY MEDICINE
Payer: COMMERCIAL

## 2022-08-11 DIAGNOSIS — Z12.31 BREAST CANCER SCREENING BY MAMMOGRAM: ICD-10-CM

## 2022-08-11 PROCEDURE — 77067 SCR MAMMO BI INCL CAD: CPT | Performed by: FAMILY MEDICINE

## 2022-08-11 PROCEDURE — 77063 BREAST TOMOSYNTHESIS BI: CPT | Performed by: FAMILY MEDICINE

## 2022-08-16 RX ORDER — FLUTICASONE PROPIONATE AND SALMETEROL 100; 50 UG/1; UG/1
1 POWDER RESPIRATORY (INHALATION) 2 TIMES DAILY
Qty: 3 EACH | Refills: 0 | Status: SHIPPED | OUTPATIENT
Start: 2022-08-16

## 2022-08-22 RX ORDER — HYDROCODONE POLISTIREX AND CHLORPHENIRAMINE POLISTIREX 10; 8 MG/5ML; MG/5ML
5 SUSPENSION, EXTENDED RELEASE ORAL 2 TIMES DAILY PRN
Qty: 70 ML | Refills: 0 | OUTPATIENT
Start: 2022-08-22 | End: 2022-08-29

## 2022-08-26 ENCOUNTER — PATIENT MESSAGE (OUTPATIENT)
Dept: FAMILY MEDICINE CLINIC | Facility: CLINIC | Age: 57
End: 2022-08-26

## 2022-08-26 RX ORDER — FLUTICASONE PROPIONATE AND SALMETEROL 250; 50 UG/1; UG/1
1 POWDER RESPIRATORY (INHALATION) EVERY 12 HOURS SCHEDULED
Qty: 3 EACH | Refills: 0 | Status: SHIPPED | OUTPATIENT
Start: 2022-08-26

## 2022-08-26 RX ORDER — FLUTICASONE PROPIONATE AND SALMETEROL 250; 50 UG/1; UG/1
1 POWDER RESPIRATORY (INHALATION) EVERY 12 HOURS SCHEDULED
COMMUNITY
End: 2022-08-26

## 2022-08-26 NOTE — TELEPHONE ENCOUNTER
From: Kin Dawson  To: Joseline Cardona MD  Sent: 8/26/2022 7:47 AM CDT  Subject: Tiredness and Cough    Dr. Kymberly Villarreal,  Since my visit I am feeling about the same and has developed the nagging cough again. The cough happens pretty frequently - about every other month and lasts for a few weeks. I usually take the tussin that was prescribed but has not improved this time. Also, I'm taking the vitamin D and B12 but still seem to be still very tired. Dr. Ally Mendez suspects my fatique may be due to previous covid experience. You had asked that I follow up since my visit and wanted you to know I'm not feeling much better. Please let me know if I need to do something different.     ThanksJami

## 2022-08-29 RX ORDER — HYDROCODONE POLISTIREX AND CHLORPHENIRAMINE POLISTIREX 10; 8 MG/5ML; MG/5ML
5 SUSPENSION, EXTENDED RELEASE ORAL 2 TIMES DAILY PRN
Qty: 70 ML | Refills: 0 | OUTPATIENT
Start: 2022-08-29 | End: 2022-09-05

## 2022-09-13 ENCOUNTER — OFFICE VISIT (OUTPATIENT)
Dept: FAMILY MEDICINE CLINIC | Facility: CLINIC | Age: 57
End: 2022-09-13
Payer: COMMERCIAL

## 2022-09-13 VITALS
BODY MASS INDEX: 33.61 KG/M2 | SYSTOLIC BLOOD PRESSURE: 130 MMHG | RESPIRATION RATE: 18 BRPM | DIASTOLIC BLOOD PRESSURE: 80 MMHG | OXYGEN SATURATION: 99 % | HEART RATE: 86 BPM | WEIGHT: 178 LBS | HEIGHT: 61 IN

## 2022-09-13 DIAGNOSIS — M79.671 RIGHT FOOT PAIN: Primary | ICD-10-CM

## 2022-09-13 PROCEDURE — 99213 OFFICE O/P EST LOW 20 MIN: CPT | Performed by: FAMILY MEDICINE

## 2022-09-13 PROCEDURE — 3008F BODY MASS INDEX DOCD: CPT | Performed by: FAMILY MEDICINE

## 2022-09-13 PROCEDURE — 3075F SYST BP GE 130 - 139MM HG: CPT | Performed by: FAMILY MEDICINE

## 2022-09-13 PROCEDURE — 3079F DIAST BP 80-89 MM HG: CPT | Performed by: FAMILY MEDICINE

## 2022-09-14 RX ORDER — CODEINE PHOSPHATE AND GUAIFENESIN 10; 100 MG/5ML; MG/5ML
5 SOLUTION ORAL EVERY 6 HOURS PRN
Qty: 236 ML | Refills: 0 | Status: SHIPPED | OUTPATIENT
Start: 2022-09-14

## 2022-09-14 RX ORDER — GUAIFENESIN AND CODEINE PHOSPHATE 10; 100 MG/5ML; MG/5ML
LIQUID ORAL
Qty: 236 ML | Refills: 0 | OUTPATIENT
Start: 2022-09-14

## 2022-09-14 NOTE — TELEPHONE ENCOUNTER
Approve/deny Guaifenesin/Codeine  Pt states she takes for chronic cough  Last filled 2/7/22  Last visit with Dr Ileana Blair 8/2/22 for physical

## 2022-10-05 ENCOUNTER — APPOINTMENT (OUTPATIENT)
Dept: URBAN - METROPOLITAN AREA CLINIC 249 | Age: 57
Setting detail: DERMATOLOGY
End: 2022-10-05

## 2022-10-05 DIAGNOSIS — L20.89 OTHER ATOPIC DERMATITIS: ICD-10-CM

## 2022-10-05 DIAGNOSIS — L21.8 OTHER SEBORRHEIC DERMATITIS: ICD-10-CM

## 2022-10-05 PROBLEM — L30.9 DERMATITIS, UNSPECIFIED: Status: ACTIVE | Noted: 2022-10-05

## 2022-10-05 PROCEDURE — OTHER PRESCRIPTION: OTHER

## 2022-10-05 PROCEDURE — OTHER PRESCRIPTION MEDICATION MANAGEMENT: OTHER

## 2022-10-05 PROCEDURE — 99203 OFFICE O/P NEW LOW 30 MIN: CPT

## 2022-10-05 PROCEDURE — OTHER MIPS QUALITY: OTHER

## 2022-10-05 PROCEDURE — OTHER COUNSELING: OTHER

## 2022-10-05 RX ORDER — ECONAZOLE NITRATE 10 MG/G
AEROSOL, FOAM TOPICAL
Qty: 60 | Refills: 5 | Status: ERX | COMMUNITY
Start: 2022-10-05

## 2022-10-05 RX ORDER — FLUOCINONIDE 0.5 MG/G
CREAM TOPICAL
Qty: 60 | Refills: 6 | Status: ERX | COMMUNITY
Start: 2022-10-05

## 2022-10-05 RX ORDER — CLOBETASOL PROPIONATE 0.5 MG/ML
SOLUTION TOPICAL
Qty: 50 | Refills: 11 | Status: ERX | COMMUNITY
Start: 2022-10-05

## 2022-10-05 RX ORDER — TERBINAFINE HYDROCHLORIDE 250 MG/1
TABLET ORAL
Qty: 14 | Refills: 0 | Status: ERX | COMMUNITY
Start: 2022-10-05

## 2022-10-05 ASSESSMENT — LOCATION SIMPLE DESCRIPTION DERM
LOCATION SIMPLE: LEFT EAR
LOCATION SIMPLE: LEFT POSTERIOR UPPER ARM
LOCATION SIMPLE: RIGHT EYEBROW
LOCATION SIMPLE: NOSE
LOCATION SIMPLE: RIGHT EAR
LOCATION SIMPLE: RIGHT POSTERIOR UPPER ARM
LOCATION SIMPLE: RIGHT SCALP
LOCATION SIMPLE: LEFT EYEBROW

## 2022-10-05 ASSESSMENT — LOCATION ZONE DERM
LOCATION ZONE: ARM
LOCATION ZONE: NOSE
LOCATION ZONE: SCALP
LOCATION ZONE: FACE
LOCATION ZONE: EAR

## 2022-10-05 ASSESSMENT — LOCATION DETAILED DESCRIPTION DERM
LOCATION DETAILED: RIGHT CRUS OF HELIX
LOCATION DETAILED: LEFT DISTAL POSTERIOR UPPER ARM
LOCATION DETAILED: LEFT CENTRAL EYEBROW
LOCATION DETAILED: NASAL SUPRATIP
LOCATION DETAILED: RIGHT MEDIAL FRONTAL SCALP
LOCATION DETAILED: RIGHT DISTAL POSTERIOR UPPER ARM
LOCATION DETAILED: RIGHT CENTRAL EYEBROW
LOCATION DETAILED: LEFT CRUS OF HELIX

## 2022-10-05 NOTE — PROCEDURE: PRESCRIPTION MEDICATION MANAGEMENT
Detail Level: Zone
Initiate Treatment: Terbinafine HCl 250 mg tablet. Take one tab daily.\\n\\nEconazole 1 % topical foam. Apply to AA QD.\\n\\nClobetasol 0.05 % scalp solution. 1 application daily after shampoo and leave in.
Render In Strict Bullet Format?: No
Initiate Treatment: fluocinonide 0.05 % topical cream. Apply to the arms BID

## 2022-10-14 RX ORDER — AMLODIPINE BESYLATE 5 MG/1
TABLET ORAL
Qty: 90 TABLET | Refills: 0 | Status: SHIPPED | OUTPATIENT
Start: 2022-10-14

## 2022-10-23 DIAGNOSIS — L30.9 DERMATITIS: ICD-10-CM

## 2022-10-24 RX ORDER — KETOCONAZOLE 20 MG/ML
SHAMPOO TOPICAL
Qty: 120 ML | Refills: 0 | Status: SHIPPED | OUTPATIENT
Start: 2022-10-24

## 2022-11-02 ENCOUNTER — OFFICE VISIT (OUTPATIENT)
Dept: FAMILY MEDICINE CLINIC | Facility: CLINIC | Age: 57
End: 2022-11-02
Payer: COMMERCIAL

## 2022-11-02 VITALS
DIASTOLIC BLOOD PRESSURE: 84 MMHG | BODY MASS INDEX: 33.61 KG/M2 | RESPIRATION RATE: 18 BRPM | OXYGEN SATURATION: 97 % | HEART RATE: 95 BPM | HEIGHT: 61 IN | TEMPERATURE: 98 F | SYSTOLIC BLOOD PRESSURE: 132 MMHG | WEIGHT: 178 LBS

## 2022-11-02 DIAGNOSIS — I10 ESSENTIAL HYPERTENSION, BENIGN: ICD-10-CM

## 2022-11-02 DIAGNOSIS — J44.1 CHRONIC OBSTRUCTIVE PULMONARY DISEASE WITH ACUTE EXACERBATION (HCC): Primary | ICD-10-CM

## 2022-11-02 PROCEDURE — 99214 OFFICE O/P EST MOD 30 MIN: CPT | Performed by: FAMILY MEDICINE

## 2022-11-02 PROCEDURE — 3075F SYST BP GE 130 - 139MM HG: CPT | Performed by: FAMILY MEDICINE

## 2022-11-02 PROCEDURE — 3079F DIAST BP 80-89 MM HG: CPT | Performed by: FAMILY MEDICINE

## 2022-11-02 PROCEDURE — 3008F BODY MASS INDEX DOCD: CPT | Performed by: FAMILY MEDICINE

## 2022-11-02 RX ORDER — AMLODIPINE BESYLATE 10 MG/1
10 TABLET ORAL DAILY
Qty: 90 TABLET | Refills: 3 | Status: SHIPPED | OUTPATIENT
Start: 2022-12-02

## 2022-11-03 ENCOUNTER — HOSPITAL ENCOUNTER (OUTPATIENT)
Dept: GENERAL RADIOLOGY | Age: 57
Discharge: HOME | End: 2022-11-03
Attending: FAMILY MEDICINE
Payer: COMMERCIAL

## 2022-11-03 DIAGNOSIS — R91.1 LUNG NODULE SEEN ON IMAGING STUDY: Primary | ICD-10-CM

## 2022-11-03 DIAGNOSIS — J44.1 CHRONIC OBSTRUCTIVE PULMONARY DISEASE WITH ACUTE EXACERBATION (HCC): ICD-10-CM

## 2022-11-03 PROCEDURE — 71046 X-RAY EXAM CHEST 2 VIEWS: CPT | Performed by: FAMILY MEDICINE

## 2022-12-01 ENCOUNTER — APPOINTMENT (OUTPATIENT)
Dept: URBAN - METROPOLITAN AREA CLINIC 249 | Age: 57
Setting detail: DERMATOLOGY
End: 2022-12-07

## 2022-12-01 DIAGNOSIS — L21.8 OTHER SEBORRHEIC DERMATITIS: ICD-10-CM

## 2022-12-01 DIAGNOSIS — R21 RASH AND OTHER NONSPECIFIC SKIN ERUPTION: ICD-10-CM

## 2022-12-01 PROBLEM — L30.9 DERMATITIS, UNSPECIFIED: Status: ACTIVE | Noted: 2022-12-01

## 2022-12-01 PROCEDURE — OTHER PRESCRIPTION: OTHER

## 2022-12-01 PROCEDURE — 99213 OFFICE O/P EST LOW 20 MIN: CPT

## 2022-12-01 PROCEDURE — OTHER PRESCRIPTION MEDICATION MANAGEMENT: OTHER

## 2022-12-01 PROCEDURE — OTHER COUNSELING: OTHER

## 2022-12-01 RX ORDER — CLOBETASOL PROPIONATE 0.5 MG/ML
SOLUTION TOPICAL
Qty: 50 | Refills: 11 | Status: ACTIVE

## 2022-12-01 RX ORDER — DOXYCYCLINE HYCLATE 100 MG/1
CAPSULE, GELATIN COATED ORAL
Qty: 60 | Refills: 2 | Status: ERX | COMMUNITY
Start: 2022-12-01

## 2022-12-01 ASSESSMENT — LOCATION ZONE DERM
LOCATION ZONE: FACE
LOCATION ZONE: AXILLAE
LOCATION ZONE: SCALP
LOCATION ZONE: NOSE
LOCATION ZONE: EAR

## 2022-12-01 ASSESSMENT — LOCATION SIMPLE DESCRIPTION DERM
LOCATION SIMPLE: RIGHT SCALP
LOCATION SIMPLE: NOSE
LOCATION SIMPLE: LEFT EAR
LOCATION SIMPLE: RIGHT EAR
LOCATION SIMPLE: LEFT POSTERIOR AXILLA
LOCATION SIMPLE: RIGHT AXILLARY VAULT
LOCATION SIMPLE: RIGHT EYEBROW
LOCATION SIMPLE: LEFT EYEBROW

## 2022-12-01 ASSESSMENT — LOCATION DETAILED DESCRIPTION DERM
LOCATION DETAILED: LEFT CENTRAL EYEBROW
LOCATION DETAILED: NASAL SUPRATIP
LOCATION DETAILED: RIGHT CRUS OF HELIX
LOCATION DETAILED: RIGHT CENTRAL EYEBROW
LOCATION DETAILED: LEFT POSTERIOR AXILLA
LOCATION DETAILED: RIGHT AXILLARY VAULT
LOCATION DETAILED: LEFT CRUS OF HELIX
LOCATION DETAILED: RIGHT MEDIAL FRONTAL SCALP

## 2022-12-01 NOTE — HPI: EVALUATION OF SKIN LESION(S)
What Type Of Note Output Would You Prefer (Optional)?: Bullet Format
How Severe Are Your Spot(S)?: mild
Have Your Spot(S) Been Treated In The Past?: has not been treated
Hpi Title: Evaluation of Skin Lesions
Additional History: Flocinonide twice a day.

## 2022-12-01 NOTE — PROCEDURE: PRESCRIPTION MEDICATION MANAGEMENT
Render In Strict Bullet Format?: No
Samples Given: Enstilar Sample given. Apply to AA QD.
Detail Level: Zone
Initiate Treatment: Clobetasol 0.05 % scalp solution. 1 application daily after shampoo and leave in.

## 2023-01-06 DIAGNOSIS — L30.9 DERMATITIS: ICD-10-CM

## 2023-01-06 RX ORDER — KETOCONAZOLE 20 MG/ML
SHAMPOO TOPICAL
Qty: 120 ML | Refills: 0 | Status: SHIPPED | OUTPATIENT
Start: 2023-01-06

## 2023-01-07 PROBLEM — R53.83 FATIGUE, UNSPECIFIED TYPE: Status: RESOLVED | Noted: 2020-08-14 | Resolved: 2023-01-07

## 2023-01-07 PROBLEM — L30.9 ECZEMA: Status: ACTIVE | Noted: 2023-01-07

## 2023-01-07 PROBLEM — M50.20 PROLAPSED CERVICAL INTERVERTEBRAL DISC: Status: ACTIVE | Noted: 2023-01-07

## 2023-01-07 PROBLEM — I10 HYPERTENSION: Status: RESOLVED | Noted: 2017-12-02 | Resolved: 2023-01-07

## 2023-01-07 PROBLEM — M19.90 ARTHRITIS: Status: ACTIVE | Noted: 2023-01-07

## 2023-01-07 PROBLEM — D80.2 IMMUNOGLOBULIN A DEFICIENCY (HCC): Status: ACTIVE | Noted: 2023-01-07

## 2023-01-07 PROBLEM — I10 HYPERTENSION: Status: ACTIVE | Noted: 2017-12-02

## 2023-01-09 ENCOUNTER — OFFICE VISIT (OUTPATIENT)
Dept: OBGYN CLINIC | Facility: CLINIC | Age: 58
End: 2023-01-09
Payer: COMMERCIAL

## 2023-01-09 VITALS
BODY MASS INDEX: 34.99 KG/M2 | HEIGHT: 60 IN | WEIGHT: 178.19 LBS | DIASTOLIC BLOOD PRESSURE: 70 MMHG | SYSTOLIC BLOOD PRESSURE: 110 MMHG

## 2023-01-09 DIAGNOSIS — Z01.419 ENCOUNTER FOR GYNECOLOGICAL EXAMINATION WITHOUT ABNORMAL FINDING: Primary | ICD-10-CM

## 2023-01-09 DIAGNOSIS — Z12.31 BREAST CANCER SCREENING BY MAMMOGRAM: ICD-10-CM

## 2023-01-09 DIAGNOSIS — Z12.4 SCREENING FOR CERVICAL CANCER: ICD-10-CM

## 2023-01-11 LAB — HPV MRNA E6/E7: NOT DETECTED

## 2023-01-12 ENCOUNTER — APPOINTMENT (OUTPATIENT)
Dept: URBAN - METROPOLITAN AREA CLINIC 249 | Age: 58
Setting detail: DERMATOLOGY
End: 2023-01-13

## 2023-01-12 DIAGNOSIS — L08.9 LOCAL INFECTION OF THE SKIN AND SUBCUTANEOUS TISSUE, UNSPECIFIED: ICD-10-CM

## 2023-01-12 DIAGNOSIS — L82.1 OTHER SEBORRHEIC KERATOSIS: ICD-10-CM

## 2023-01-12 DIAGNOSIS — R21 RASH AND OTHER NONSPECIFIC SKIN ERUPTION: ICD-10-CM

## 2023-01-12 DIAGNOSIS — L91.8 OTHER HYPERTROPHIC DISORDERS OF THE SKIN: ICD-10-CM

## 2023-01-12 PROCEDURE — OTHER OTHER: OTHER

## 2023-01-12 PROCEDURE — 99213 OFFICE O/P EST LOW 20 MIN: CPT

## 2023-01-12 PROCEDURE — OTHER COUNSELING: OTHER

## 2023-01-12 ASSESSMENT — LOCATION ZONE DERM
LOCATION ZONE: FACE
LOCATION ZONE: NECK
LOCATION ZONE: SCALP

## 2023-01-12 ASSESSMENT — LOCATION DETAILED DESCRIPTION DERM
LOCATION DETAILED: RIGHT SUPERIOR CENTRAL MALAR CHEEK
LOCATION DETAILED: RIGHT MEDIAL MALAR CHEEK
LOCATION DETAILED: LEFT CENTRAL MALAR CHEEK
LOCATION DETAILED: LEFT SUPERIOR PARIETAL SCALP
LOCATION DETAILED: LEFT SUPERIOR FOREHEAD
LOCATION DETAILED: RIGHT CENTRAL PARIETAL SCALP
LOCATION DETAILED: RIGHT INFERIOR LATERAL NECK
LOCATION DETAILED: RIGHT CLAVICULAR NECK
LOCATION DETAILED: LEFT INFERIOR LATERAL NECK
LOCATION DETAILED: LEFT MEDIAL MALAR CHEEK

## 2023-01-12 ASSESSMENT — SEVERITY ASSESSMENT: SEVERITY: ALMOST CLEAR

## 2023-01-12 ASSESSMENT — LOCATION SIMPLE DESCRIPTION DERM
LOCATION SIMPLE: RIGHT CHEEK
LOCATION SIMPLE: LEFT ANTERIOR NECK
LOCATION SIMPLE: LEFT CHEEK
LOCATION SIMPLE: LEFT FOREHEAD
LOCATION SIMPLE: RIGHT ANTERIOR NECK
LOCATION SIMPLE: SCALP

## 2023-01-12 ASSESSMENT — BSA RASH: BSA RASH: 4

## 2023-01-12 NOTE — PROCEDURE: OTHER
Note Text (......Xxx Chief Complaint.): This diagnosis correlates with the
Detail Level: Zone
Other (Free Text): Swelling and discomfort has gone away on left medial cheek with Doxycycline use. Patient has also noticed an improvement in the lesions in the armpits
Render Risk Assessment In Note?: no
Other (Free Text): Patient is using Clobetasol scalp solution but also really like Enstilar foam which she was given a full trade size bottle of last visit. She will call when she needs more Enstilar and we can refill

## 2023-01-30 RX ORDER — FLUTICASONE PROPIONATE AND SALMETEROL 250; 50 UG/1; UG/1
1 POWDER RESPIRATORY (INHALATION) EVERY 12 HOURS
Qty: 3 EACH | Refills: 0 | Status: SHIPPED | OUTPATIENT
Start: 2023-01-30

## 2023-03-29 DIAGNOSIS — L30.9 DERMATITIS: ICD-10-CM

## 2023-03-29 RX ORDER — KETOCONAZOLE 20 MG/ML
SHAMPOO TOPICAL
Qty: 120 ML | Refills: 0 | Status: SHIPPED | OUTPATIENT
Start: 2023-03-29

## 2023-04-26 RX ORDER — FLUTICASONE PROPIONATE AND SALMETEROL 250; 50 UG/1; UG/1
POWDER RESPIRATORY (INHALATION)
Qty: 3 EACH | Refills: 0 | Status: SHIPPED | OUTPATIENT
Start: 2023-04-26

## 2023-06-16 DIAGNOSIS — L30.9 DERMATITIS: ICD-10-CM

## 2023-06-16 RX ORDER — KETOCONAZOLE 20 MG/ML
SHAMPOO TOPICAL
Qty: 120 ML | Refills: 0 | Status: SHIPPED | OUTPATIENT
Start: 2023-06-16

## 2023-08-28 RX ORDER — BUTALBITAL, ACETAMINOPHEN AND CAFFEINE 50; 325; 40 MG/1; MG/1; MG/1
1-2 TABLET ORAL EVERY 4 HOURS PRN
Qty: 20 TABLET | Refills: 0 | Status: SHIPPED | OUTPATIENT
Start: 2023-08-28

## 2023-08-28 NOTE — TELEPHONE ENCOUNTER
A refill request was received for:  Requested Prescriptions     Pending Prescriptions Disp Refills    BUTALBITAL-ACETAMINOPHEN-CAFFEINE -40 MG Oral Tab [Pharmacy Med Name: Butalbital-APAP-Caffeine Oral Tablet -40 MG] 20 tablet 0     Sig: take 1-2 tablets by mouth every 4 hours as needed for pain       Last refill date:1/13/2022      Last office visit:11/2/2023     Follow up due:  Future Appointments   Date Time Provider Ivory Garg   9/25/2023  1:30 PM Monica William MD [facility] [address]

## 2023-09-25 ENCOUNTER — OFFICE VISIT (OUTPATIENT)
Dept: FAMILY MEDICINE CLINIC | Facility: CLINIC | Age: 58
End: 2023-09-25
Payer: COMMERCIAL

## 2023-09-25 VITALS
OXYGEN SATURATION: 97 % | DIASTOLIC BLOOD PRESSURE: 72 MMHG | HEART RATE: 88 BPM | WEIGHT: 178 LBS | SYSTOLIC BLOOD PRESSURE: 138 MMHG | HEIGHT: 60 IN | BODY MASS INDEX: 34.95 KG/M2 | RESPIRATION RATE: 15 BRPM

## 2023-09-25 DIAGNOSIS — H00.14 CHALAZION OF LEFT UPPER EYELID: ICD-10-CM

## 2023-09-25 DIAGNOSIS — I10 ESSENTIAL HYPERTENSION, BENIGN: ICD-10-CM

## 2023-09-25 DIAGNOSIS — Z00.00 ROUTINE GENERAL MEDICAL EXAMINATION AT A HEALTH CARE FACILITY: Primary | ICD-10-CM

## 2023-09-25 DIAGNOSIS — M32.9 SYSTEMIC LUPUS ERYTHEMATOSUS, UNSPECIFIED SLE TYPE, UNSPECIFIED ORGAN INVOLVEMENT STATUS (HCC): ICD-10-CM

## 2023-09-25 PROCEDURE — 3078F DIAST BP <80 MM HG: CPT | Performed by: FAMILY MEDICINE

## 2023-09-25 PROCEDURE — 3075F SYST BP GE 130 - 139MM HG: CPT | Performed by: FAMILY MEDICINE

## 2023-09-25 PROCEDURE — 99396 PREV VISIT EST AGE 40-64: CPT | Performed by: FAMILY MEDICINE

## 2023-09-25 PROCEDURE — 3008F BODY MASS INDEX DOCD: CPT | Performed by: FAMILY MEDICINE

## 2023-09-25 RX ORDER — DOXYCYCLINE HYCLATE 100 MG/1
CAPSULE ORAL
COMMUNITY
Start: 2023-04-27

## 2023-09-25 RX ORDER — DEXAMETHASONE SODIUM PHOSPHATE 1 MG/ML
SOLUTION/ DROPS OPHTHALMIC
COMMUNITY
Start: 2023-05-15

## 2023-09-25 RX ORDER — AMLODIPINE BESYLATE 10 MG/1
10 TABLET ORAL DAILY
Qty: 90 TABLET | Refills: 3 | Status: SHIPPED | OUTPATIENT
Start: 2023-11-25

## 2023-09-25 RX ORDER — TRAMADOL HYDROCHLORIDE 50 MG/1
50 TABLET ORAL EVERY 8 HOURS PRN
Qty: 60 TABLET | Refills: 0 | Status: SHIPPED | OUTPATIENT
Start: 2023-09-25

## 2023-10-27 LAB
ANA SER QL IF: NEGATIVE
BASOPHILS # BLD AUTO: 7 CELLS/UL (ref 0–200)
BASOPHILS NFR BLD AUTO: 0.1 %
BLASTS # BLD: ABNORMAL CELLS/UL
BLASTS NFR BLD MANUAL: ABNORMAL %
BUN SERPL-MCNC: 19 MG/DL (ref 7–25)
BUN/CREAT SERPL: NORMAL (CALC) (ref 6–22)
C3 SERPL-MCNC: 167 MG/DL (ref 83–193)
C4 SERPL-MCNC: 38 MG/DL (ref 15–57)
CREAT SERPL-MCNC: 0.8 MG/DL (ref 0.5–1.05)
EGFRCR SERPLBLD CKD-EPI 2021: 84 ML/MIN/1.73M2
EOSINOPHIL # BLD AUTO: 143 CELLS/UL (ref 15–500)
EOSINOPHIL NFR BLD AUTO: 2.1 %
ERYTHROCYTE [DISTWIDTH] IN BLOOD BY AUTOMATED COUNT: 14.3 % (ref 11–15)
HCT VFR BLD AUTO: 37.6 % (ref 35–45)
HGB BLD-MCNC: 11.4 G/DL (ref 11.7–15.5)
LYMPHOCYTES # BLD AUTO: 2183 CELLS/UL (ref 850–3900)
LYMPHOCYTES NFR BLD AUTO: 32.1 %
MCH RBC QN AUTO: 22.6 PG (ref 27–33)
MCHC RBC AUTO-ENTMCNC: 30.3 G/DL (ref 32–36)
MCV RBC AUTO: 74.5 FL (ref 80–100)
METAMYELOCYTES # BLD: ABNORMAL CELLS/UL
METAMYELOCYTES NFR BLD MANUAL: ABNORMAL %
MONOCYTES # BLD AUTO: 510 CELLS/UL (ref 200–950)
MONOCYTES NFR BLD AUTO: 7.5 %
MYELOCYTES # BLD: ABNORMAL CELLS/UL
MYELOCYTES NFR BLD MANUAL: ABNORMAL %
NEUTROPHILS # BLD AUTO: 3958 CELLS/UL (ref 1500–7800)
NEUTROPHILS NFR BLD AUTO: 58.2 %
NEUTS BAND # BLD: ABNORMAL CELLS/UL (ref 0–750)
NEUTS BAND NFR BLD MANUAL: ABNORMAL %
NRBC # BLD: ABNORMAL CELLS/UL
NRBC BLD-RTO: ABNORMAL /100 WBC
PLATELET # BLD AUTO: 243 THOUSAND/UL (ref 140–400)
PMV BLD REES-ECKER: 9.8 FL (ref 7.5–12.5)
PROMYELOCYTES # BLD: ABNORMAL CELLS/UL
PROMYELOCYTES NFR BLD MANUAL: ABNORMAL %
RBC # BLD AUTO: 5.05 MILLION/UL (ref 3.8–5.1)
SERVICE CMNT-IMP: ABNORMAL
VARIANT LYMPHS NFR BLD: ABNORMAL % (ref 0–10)
WBC # BLD AUTO: 6.8 THOUSAND/UL (ref 3.8–10.8)

## 2023-11-09 ENCOUNTER — HOSPITAL ENCOUNTER (OUTPATIENT)
Dept: MAMMOGRAPHY | Facility: HOSPITAL | Age: 58
Discharge: HOME | End: 2023-11-09
Attending: OBSTETRICS & GYNECOLOGY
Payer: COMMERCIAL

## 2023-11-09 DIAGNOSIS — Z12.31 BREAST CANCER SCREENING BY MAMMOGRAM: ICD-10-CM

## 2023-11-09 PROCEDURE — 77063 BREAST TOMOSYNTHESIS BI: CPT | Performed by: OBSTETRICS & GYNECOLOGY

## 2023-11-09 PROCEDURE — 77067 SCR MAMMO BI INCL CAD: CPT | Performed by: OBSTETRICS & GYNECOLOGY

## 2023-11-17 DIAGNOSIS — M54.32 BILATERAL SCIATICA: ICD-10-CM

## 2023-11-17 DIAGNOSIS — M54.31 BILATERAL SCIATICA: ICD-10-CM

## 2023-11-17 RX ORDER — GABAPENTIN 300 MG/1
300 CAPSULE ORAL 4 TIMES DAILY
Qty: 360 CAPSULE | Refills: 0 | Status: SHIPPED | OUTPATIENT
Start: 2023-11-17

## 2023-11-17 NOTE — TELEPHONE ENCOUNTER
A refill request was received for:  Requested Prescriptions     Pending Prescriptions Disp Refills    GABAPENTIN 300 MG Oral Cap [Pharmacy Med Name: Gabapentin Oral Capsule 300 MG] 360 capsule 0     Sig: TAKE ONE CAPSULE BY MOUTH FOUR TIMES DAILY       Last refill date: 2020      Last office visit: 9/25/2023    Follow up due:  No future appointments.

## 2023-12-05 ENCOUNTER — TELEMEDICINE (OUTPATIENT)
Dept: FAMILY MEDICINE CLINIC | Facility: CLINIC | Age: 58
End: 2023-12-05
Payer: COMMERCIAL

## 2023-12-05 DIAGNOSIS — B96.89 ACUTE BACTERIAL SINUSITIS: Primary | ICD-10-CM

## 2023-12-05 DIAGNOSIS — J01.90 ACUTE BACTERIAL SINUSITIS: Primary | ICD-10-CM

## 2023-12-05 PROBLEM — R23.4 FISSURE IN SKIN: Status: RESOLVED | Noted: 2018-06-02 | Resolved: 2023-12-05

## 2023-12-05 PROCEDURE — 99213 OFFICE O/P EST LOW 20 MIN: CPT | Performed by: INTERNAL MEDICINE

## 2023-12-05 RX ORDER — AMOXICILLIN AND CLAVULANATE POTASSIUM 875; 125 MG/1; MG/1
TABLET, FILM COATED ORAL
Qty: 20 TABLET | Refills: 0 | Status: SHIPPED | OUTPATIENT
Start: 2023-12-05

## 2023-12-05 RX ORDER — CODEINE PHOSPHATE/GUAIFENESIN 10-100MG/5
LIQUID (ML) ORAL
Qty: 236 ML | Refills: 0 | Status: SHIPPED | OUTPATIENT
Start: 2023-12-05

## 2023-12-05 NOTE — PROGRESS NOTES
Video Visit  Tj Corral is a 59year old female. Chief Complaint   Patient presents with    Upper Respiratory Infection         HPI:   Pt requests a video visit due to the Covid pandemic to discuss sickness. 1 week. Heaviness in the chest.  Coughing, more at night. Not productive. + chills  Today was 'a little better'. + congested  Using Nyquil and Mucinex      Current Outpatient Medications   Medication Sig Dispense Refill    gabapentin 300 MG Oral Cap Take 1 capsule (300 mg total) by mouth 4 (four) times daily. 360 capsule 0    dexamethasone 0.1 % Ophthalmic Solution       doxycycline 100 MG Oral Cap       fluocinonide 0.05 % External Cream       amLODIPine 10 MG Oral Tab Take 1 tablet (10 mg total) by mouth daily. 90 tablet 3    traMADol 50 MG Oral Tab Take 1 tablet (50 mg total) by mouth every 8 (eight) hours as needed for Pain. 60 tablet 0    butalbital-acetaminophen-caffeine -40 MG Oral Tab Take 1-2 tablets by mouth every 4 (four) hours as needed for Pain. 20 tablet 0    KETOCONAZOLE 2 % External Shampoo APPLY 5ML TOPICALLY TWICE A WEEK 120 mL 0    FLUTICASONE-SALMETEROL 250-50 MCG/ACT Inhalation Aerosol Powder, Breath Activated INHALE 1 PUFF INTO THE LUNGS EVERY 12 HOURS 3 each 0    guaiFENesin-codeine (CHERATUSSIN AC) 100-10 MG/5ML Oral Solution Take 5 mL by mouth every 6 (six) hours as needed for cough or congestion. 236 mL 0    HYDROCORTISONE 2.5 % External Cream APPLY TOPICALLY TO THE FACE TWICE DAILY 28.35 g 0    BETAMETHASONE DIPROPIONATE 0.05 % External Cream apply topically to affected area twice daily, do not apply on face 45 g 0    hydroxychloroquine 200 MG Oral Tab       albuterol 108 (90 Base) MCG/ACT Inhalation Aero Soln Inhale 2 puffs into the lungs every 4 (four) hours as needed for Shortness of Breath. 1 each 0    predniSONE 5 MG Oral Tab Take 1 tablet (5 mg total) by mouth daily.  90 tablet 3    fluticasone propionate 50 MCG/ACT Nasal Suspension 2 sprays by Each Nare route daily. 1 each 1      Past Medical History:   Diagnosis Date    ANEMIA     Brachial neuritis or radiculitis NOS     HEADACHES     HYPERTENSION     IBS (irritable bowel syndrome)     Migraine, unspecified, without mention of intractable migraine without mention of status migrainosus     Osteoarthritis of left shoulder 2/6/2019    Other and unspecified ovarian cyst     OTHER DISEASES     lupus    Other malaise and fatigue     Other psoriasis     Other specified disease of sebaceous glands     PONV (postoperative nausea and vomiting)     Scleritis, unspecified     Tarsal tunnel syndrome     Tendonosis 2/6/2019    Left shoulder    Visual impairment     glasses/contacts      Past Surgical History:   Procedure Laterality Date    Back surgery  3/23/16    C5-6-7    Colonoscopy  8/2009    Fluor gid & loclzj ndl/cath spi dx/ther njx N/A 10/14/2015    Procedure: CERVICAL EPIDURAL;  Surgeon: Zaheer Weston MD;  Location: [address] [place] [address] gid & loclzj ndl/cath spi dx/ther njx  11/11/2015    Procedure: ;  Surgeon: Zaheer Weston MD;  Location: [address] [place] [address] [facility]    Injection, w/wo contrast, dx/therapeutic substance, epidural/subarachnoid; cervical/thoracic N/A 10/14/2015    Procedure: CERVICAL EPIDURAL;  Surgeon: Zaheer Weston MD;  Location: [address] [place] [address] [facility]    Injection, w/wo contrast, dx/therapeutic substance, epidural/subarachnoid; cervical/thoracic N/A 11/11/2015    Procedure: CERVICAL EPIDURAL;  Surgeon: Zaheer Weston MD;  Location: [address] [place] [address] by  phys perfrmg sv 5+ yr N/A 10/14/2015    Procedure: CERVICAL EPIDURAL;  Surgeon: Zaheer Weston MD;  Location: [address] [place] [address] by  phys perfrmg svc 5+ yr  11/11/2015    Procedure: ;  Surgeon: Zaheer Weston MD;  Location: [address] [place] [address]    Other      ovarian cystectomy    Other surgical history  2008    uterine ablation    Other surgical history  2007 fattty tumor removal, left flank    Tubal ligation        Social History:  Social History     Socioeconomic History    Marital status:    Tobacco Use    Smoking status: Never    Smokeless tobacco: Never   Vaping Use    Vaping Use: Never used   Substance and Sexual Activity    Alcohol use: No    Drug use: No    Sexual activity: Yes   Other Topics Concern    Caffeine Concern No    Exercise Yes    Seat Belt Yes        REVIEW OF SYSTEMS:   GENERAL HEALTH: Denies fevers, chills or myalgias  HEENT: Denies ear pain, + nasal congestion, + sinus pain, no  sore throat  SKIN: Denies rash  LUNGS: Denies shortness of breath, wheezing; + mostly dry but sometimes loose cough  CV: Denies chest pain or palpitations; denies lightheadedness  GI: Denies abdominal pain, denies N/V/D  MS: Denies back, neck or joint pain  NEURO: +  headaches  ALLERGY/ASTHMA: Denies asthma and environmental allergies       EXAM:   GENERAL: well developed, well nourished, NAD. SKIN: No rash visible  HEENT: AT/NC. Normal lips, gums and teeth; + hyponasal tone on video visit  LUNGS: Speaking in complete sentences comfortably without increased work of breathing; + rare dry cough during visit  PSYCH: Normal mood and affect, A&Ox3, affect is consistent with mood      ASSESSMENT AND PLAN:   1. Acute bacterial sinusitis  - continue supportive measures  - amoxicillin clavulanate 875-125 MG Oral Tab; 1 tab po BIDPC x 10 days  Dispense: 20 tablet; Refill: 0  - guaiFENesin-Codeine 100-10 MG/5ML Oral Syrup; 1-2 tsp po Q 4-6 hrs prn cough  Dispense: 236 mL; Refill: 0          The patient indicates understanding of these issues and agrees to the plan. Follow up if not better     Duration of visit: 15 min      Jami Cabrera understands a video evaluation is not a substitute for face-to-face examination or emergency care. Patient advised to go to ER or call 911 for worsening symptoms or acute distress.    Please note that the following visit was completed using two-way, real-time interactive video communication. This has been done in good valerie to provide continuity of care in the best interest of the provider-patient relationship, due to the ongoing public health crisis/national emergency and because of restrictions of visitation. There are limitations of this visit as limited physical exam could be performed. Every conscious effort was taken to allow for sufficient and adequate time. This billing was spent on reviewing labs, medications, radiology tests and decision making. Appropriate medical decision-making and tests are ordered as detailed in the plan of care above.

## 2023-12-13 ENCOUNTER — HOSPITAL ENCOUNTER (EMERGENCY)
Age: 58
Discharge: HOME | End: 2023-12-13
Attending: EMERGENCY MEDICINE
Payer: COMMERCIAL

## 2023-12-13 ENCOUNTER — APPOINTMENT (OUTPATIENT)
Dept: GENERAL RADIOLOGY | Age: 58
End: 2023-12-13
Attending: EMERGENCY MEDICINE
Payer: COMMERCIAL

## 2023-12-13 VITALS
SYSTOLIC BLOOD PRESSURE: 137 MMHG | TEMPERATURE: 98 F | DIASTOLIC BLOOD PRESSURE: 86 MMHG | OXYGEN SATURATION: 97 % | RESPIRATION RATE: 18 BRPM | HEART RATE: 91 BPM

## 2023-12-13 DIAGNOSIS — J40 BRONCHITIS: Primary | ICD-10-CM

## 2023-12-13 PROCEDURE — 99283 EMERGENCY DEPT VISIT LOW MDM: CPT

## 2023-12-13 PROCEDURE — 94640 AIRWAY INHALATION TREATMENT: CPT

## 2023-12-13 PROCEDURE — 99284 EMERGENCY DEPT VISIT MOD MDM: CPT

## 2023-12-13 PROCEDURE — 71046 X-RAY EXAM CHEST 2 VIEWS: CPT | Performed by: EMERGENCY MEDICINE

## 2023-12-13 RX ORDER — PREDNISONE 20 MG/1
40 TABLET ORAL DAILY
Qty: 10 TABLET | Refills: 0 | Status: SHIPPED | OUTPATIENT
Start: 2023-12-13 | End: 2023-12-18

## 2023-12-13 RX ORDER — ALBUTEROL SULFATE 90 UG/1
4 AEROSOL, METERED RESPIRATORY (INHALATION) ONCE
Status: COMPLETED | OUTPATIENT
Start: 2023-12-13 | End: 2023-12-13

## 2023-12-13 NOTE — DISCHARGE INSTRUCTIONS
Use your albuterol inhaler as needed. Continue taking Advair as previously prescribed. Take the prednisone as directed. Follow up with your primary doctor. If you have new, changing or worsening symptoms, please go directly to the ER.

## 2024-01-08 DIAGNOSIS — G43.C0 PERIODIC HEADACHE SYNDROME, NOT INTRACTABLE: Primary | ICD-10-CM

## 2024-01-09 RX ORDER — BUTALBITAL, ACETAMINOPHEN AND CAFFEINE 50; 325; 40 MG/1; MG/1; MG/1
1 TABLET ORAL EVERY 6 HOURS PRN
Qty: 20 TABLET | Refills: 0 | Status: SHIPPED | OUTPATIENT
Start: 2024-01-09

## 2024-01-09 NOTE — TELEPHONE ENCOUNTER
.A refill request was received for:  Requested Prescriptions     Pending Prescriptions Disp Refills    BUTALBITAL-ACETAMINOPHEN-CAFFEINE -40 MG Oral Tab [Pharmacy Med Name: Butalbital-APAP-Caffeine Oral Tablet -40 MG] 20 tablet 0     Sig: TAKE 1-2 TABLETS BY MOUTH 4 HOURS AS NEEDED FOR PAIN       Last refill date:   8/28/2023    Last office visit: 9/25/2023    Follow up due:  No future appointments.

## 2024-01-23 RX ORDER — CLOBETASOL PROPIONATE 0.46 MG/ML
SOLUTION TOPICAL
Qty: 50 ML | Refills: 0 | OUTPATIENT
Start: 2024-01-23

## 2024-01-23 NOTE — TELEPHONE ENCOUNTER
A refill request was received for:  Requested Prescriptions     Pending Prescriptions Disp Refills    CLOBETASOL 0.05 % External Solution [Pharmacy Med Name: Clobetasol Propionate External Solution 0.05 %] 50 mL 0     Sig: apply 1 application daily after shampoo & leave in       Last refill date:       Last office visit: 9/25/2023    Follow up due:  No future appointments.

## 2024-03-06 ENCOUNTER — TELEPHONE (OUTPATIENT)
Dept: FAMILY MEDICINE CLINIC | Facility: CLINIC | Age: 59
End: 2024-03-06

## 2024-03-06 DIAGNOSIS — L30.9 DERMATITIS: ICD-10-CM

## 2024-03-06 RX ORDER — KETOCONAZOLE 20 MG/ML
5 SHAMPOO TOPICAL
Qty: 120 ML | Refills: 0 | Status: SHIPPED | OUTPATIENT
Start: 2024-03-07

## 2024-03-06 RX ORDER — CLOBETASOL PROPIONATE 0.46 MG/ML
5 SOLUTION TOPICAL 2 TIMES DAILY
Qty: 50 ML | Refills: 0 | Status: SHIPPED | OUTPATIENT
Start: 2024-03-06

## 2024-03-06 NOTE — TELEPHONE ENCOUNTER
Pt asking why her CLOBETASOL 0.05 % External Solution request was denied - pt said she needs this for her scalp.

## 2024-03-06 NOTE — TELEPHONE ENCOUNTER
Approve/deny CLOBETASOL PROPIONATE 0.05 % External Solution  for scalp and Ketoconazole 2 % External Shampoo   We have not filled in a few years.Pt states she uses sparingly for dry scalp/eczema  Last visit 9/25/23

## 2024-04-20 ENCOUNTER — APPOINTMENT (OUTPATIENT)
Dept: DERMATOLOGY | Age: 59
End: 2024-04-20

## 2024-05-28 DIAGNOSIS — M51.36 DEGENERATIVE DISC DISEASE, LUMBAR: Primary | ICD-10-CM

## 2024-05-28 RX ORDER — TRAMADOL HYDROCHLORIDE 50 MG/1
50 TABLET ORAL EVERY 8 HOURS PRN
Qty: 60 TABLET | Refills: 0 | Status: SHIPPED | OUTPATIENT
Start: 2024-05-28

## 2024-05-28 NOTE — TELEPHONE ENCOUNTER
A refill request was received for:  Requested Prescriptions     Pending Prescriptions Disp Refills    TRAMADOL 50 MG Oral Tab [Pharmacy Med Name: traMADol HCl Oral Tablet 50 MG] 60 tablet 0     Sig: TAKE ONE TABLET BY MOUTH EVERY EIGHT HOURS AS NEEDED FOR PAIN       Last refill date:   9/25/2023    Last office visit: 9/25/2023    Follow up due:  No future appointments.

## 2024-06-13 ENCOUNTER — OFFICE VISIT (OUTPATIENT)
Dept: OBGYN CLINIC | Facility: CLINIC | Age: 59
End: 2024-06-13
Payer: COMMERCIAL

## 2024-06-13 VITALS
BODY MASS INDEX: 34 KG/M2 | WEIGHT: 174 LBS | SYSTOLIC BLOOD PRESSURE: 122 MMHG | HEART RATE: 97 BPM | DIASTOLIC BLOOD PRESSURE: 70 MMHG

## 2024-06-13 DIAGNOSIS — N89.8 VAGINAL LEUKORRHEA: ICD-10-CM

## 2024-06-13 DIAGNOSIS — Z01.419 WELL WOMAN EXAM WITH ROUTINE GYNECOLOGICAL EXAM: Primary | ICD-10-CM

## 2024-06-13 DIAGNOSIS — Z12.31 ENCOUNTER FOR SCREENING MAMMOGRAM FOR BREAST CANCER: ICD-10-CM

## 2024-06-13 PROCEDURE — 99396 PREV VISIT EST AGE 40-64: CPT | Performed by: NURSE PRACTITIONER

## 2024-06-13 PROCEDURE — 81514 NFCT DS BV&VAGINITIS DNA ALG: CPT | Performed by: NURSE PRACTITIONER

## 2024-06-13 RX ORDER — FOLIC ACID 1 MG/1
TABLET ORAL
COMMUNITY
Start: 2024-06-03

## 2024-06-13 RX ORDER — TIZANIDINE 4 MG/1
4 TABLET ORAL NIGHTLY
COMMUNITY
Start: 2024-03-04

## 2024-06-13 RX ORDER — NAPROXEN 500 MG/1
500 TABLET ORAL 2 TIMES DAILY
COMMUNITY
Start: 2023-10-30

## 2024-06-13 RX ORDER — METHOTREXATE 2.5 MG/1
7.5 TABLET ORAL WEEKLY
COMMUNITY
Start: 2024-05-08

## 2024-06-13 NOTE — PROGRESS NOTES
Here for Routine Annual Exam  Notes a recent increase in discharge the last month. It seems green. There is a different odor than her usual, denies any itching or burning.    Denies any pelvic concerns or vaginal bleeding    She is OK to defer her pap    ROS: No Cardiac, Respiratory, GI,  or Neurological symptoms.    PE:  GENERAL: well developed, well nourished, in no apparent distress  SKIN: no rashes, no suspicious lesions  HEENT: normal  NECK: supple; no thyroidmegaly, no adenopathy  LUNGS: clear to auscultation  CARDIOVASCULAR: normal S1, S2, RRR  BREASTS: firm, nontendder, no palpable masses or nodes, no nipple discharge, no skin changes, no axillary adenopathy,    ABDOMEN: Soft, non distended; non tender, no masses  GYNE/: External Genitalia: Normal without lesions or erythema                      Vagina: atrophic without lesions, moderate thin yellow discharge                       Uterus: mid, mobile, non tender, normal size                     Cervix: no lesions or CMT                     Adnexa: non tender, no masses, normal size  EXTREMITIES:  non tender without edema    A/P:   1. Well woman exam with routine gynecological exam  Pap deferred    2. Encounter for screening mammogram for breast cancer  Regular self breast exams recommended  - [facility] GENOVEVA 2D+3D SCREENING BILAT (CPT=77067/61384); Future    3. Vaginal leukorrhea  - Vaginitis Vaginosis PCR Panel; Future     Return to clinic 1 year for routine exam, or as needed with any concerns or question

## 2024-06-14 LAB
BV BACTERIA DNA VAG QL NAA+PROBE: NEGATIVE
C GLABRATA DNA VAG QL NAA+PROBE: NEGATIVE
C KRUSEI DNA VAG QL NAA+PROBE: NEGATIVE
CANDIDA DNA VAG QL NAA+PROBE: NEGATIVE
T VAGINALIS DNA VAG QL NAA+PROBE: NEGATIVE

## 2024-06-19 ENCOUNTER — PATIENT MESSAGE (OUTPATIENT)
Dept: OBGYN CLINIC | Facility: CLINIC | Age: 59
End: 2024-06-19

## 2024-06-20 NOTE — TELEPHONE ENCOUNTER
From: Jami Cross  To: Sameera Rodríguez  Sent: 6/19/2024 7:53 AM CDT  Subject: Follow up question after visit/test    Dr. Rodríguez,  Thanks for the test results. What is next step concerning my condition. Discharge continues.

## 2024-06-20 NOTE — TELEPHONE ENCOUNTER
Try OTC Luvina vaginal rinse. Use once weekly for 2 weeks. If her discharge continues I would have her return for evaluation with an MD.

## 2024-08-05 ENCOUNTER — OFFICE VISIT (OUTPATIENT)
Dept: FAMILY MEDICINE CLINIC | Facility: CLINIC | Age: 59
End: 2024-08-05
Payer: COMMERCIAL

## 2024-08-05 VITALS
OXYGEN SATURATION: 98 % | SYSTOLIC BLOOD PRESSURE: 112 MMHG | HEART RATE: 83 BPM | HEIGHT: 60 IN | RESPIRATION RATE: 16 BRPM | BODY MASS INDEX: 34.43 KG/M2 | DIASTOLIC BLOOD PRESSURE: 62 MMHG | WEIGHT: 175.38 LBS

## 2024-08-05 DIAGNOSIS — I10 ESSENTIAL HYPERTENSION, BENIGN: ICD-10-CM

## 2024-08-05 DIAGNOSIS — M67.432 GANGLION CYST OF WRIST, LEFT: ICD-10-CM

## 2024-08-05 DIAGNOSIS — M54.32 BILATERAL SCIATICA: ICD-10-CM

## 2024-08-05 DIAGNOSIS — G43.C0 PERIODIC HEADACHE SYNDROME, NOT INTRACTABLE: ICD-10-CM

## 2024-08-05 DIAGNOSIS — J44.9 CHRONIC OBSTRUCTIVE PULMONARY DISEASE, UNSPECIFIED COPD TYPE (HCC): Primary | ICD-10-CM

## 2024-08-05 DIAGNOSIS — M51.36 DEGENERATIVE DISC DISEASE, LUMBAR: ICD-10-CM

## 2024-08-05 DIAGNOSIS — L30.9 DERMATITIS: ICD-10-CM

## 2024-08-05 DIAGNOSIS — M54.31 BILATERAL SCIATICA: ICD-10-CM

## 2024-08-05 PROCEDURE — 99214 OFFICE O/P EST MOD 30 MIN: CPT | Performed by: FAMILY MEDICINE

## 2024-08-05 RX ORDER — AMLODIPINE BESYLATE 10 MG/1
10 TABLET ORAL DAILY
Qty: 90 TABLET | Refills: 3 | Status: SHIPPED | OUTPATIENT
Start: 2024-08-05

## 2024-08-05 RX ORDER — CODEINE PHOSPHATE/GUAIFENESIN 10-100MG/5
LIQUID (ML) ORAL
Qty: 236 ML | Refills: 0 | Status: SHIPPED | OUTPATIENT
Start: 2024-08-05

## 2024-08-05 RX ORDER — BUTALBITAL, ACETAMINOPHEN AND CAFFEINE 50; 325; 40 MG/1; MG/1; MG/1
1 TABLET ORAL EVERY 6 HOURS PRN
Qty: 20 TABLET | Refills: 0 | Status: SHIPPED | OUTPATIENT
Start: 2024-08-05

## 2024-08-05 RX ORDER — CLOBETASOL PROPIONATE 0.5 MG/ML
5 SOLUTION TOPICAL 2 TIMES DAILY
Qty: 50 ML | Refills: 0 | Status: SHIPPED | OUTPATIENT
Start: 2024-08-05

## 2024-08-05 RX ORDER — TRAMADOL HYDROCHLORIDE 50 MG/1
50 TABLET ORAL EVERY 8 HOURS PRN
Qty: 60 TABLET | Refills: 0 | Status: SHIPPED | OUTPATIENT
Start: 2024-08-05

## 2024-08-05 RX ORDER — FLUTICASONE PROPIONATE AND SALMETEROL 250; 50 UG/1; UG/1
1 POWDER RESPIRATORY (INHALATION) EVERY 12 HOURS
Qty: 1 EACH | Refills: 0 | Status: SHIPPED | OUTPATIENT
Start: 2024-08-05

## 2024-08-05 RX ORDER — GABAPENTIN 300 MG/1
300 CAPSULE ORAL 4 TIMES DAILY
Qty: 360 CAPSULE | Refills: 1 | Status: SHIPPED | OUTPATIENT
Start: 2024-08-05

## 2024-08-05 RX ORDER — ALBUTEROL SULFATE 90 UG/1
2 AEROSOL, METERED RESPIRATORY (INHALATION) EVERY 4 HOURS PRN
Qty: 1 EACH | Refills: 0 | Status: SHIPPED | OUTPATIENT
Start: 2024-08-05

## 2024-08-05 RX ORDER — ONDANSETRON 4 MG/1
TABLET, FILM COATED ORAL
COMMUNITY
Start: 2024-08-02

## 2024-08-05 NOTE — PROGRESS NOTES
Follow-up for medication refills.  She is feeling well overall.  She admits she is not using the inhalers regularly and even the Advair only when she starts to have a flareup.  She would like the Advair and albuterol refilled however.    Her home blood pressures are done regularly and they are always good.    She has a new bump on her volar surface of the left wrist.  She is left-handed.  Gets bigger and smaller.  It can be painful.    PAST MEDICAL HISTORY:  Past Medical History:    ANEMIA    Brachial neuritis or radiculitis NOS    HEADACHES    HYPERTENSION    IBS (irritable bowel syndrome)    Migraine, unspecified, without mention of intractable migraine without mention of status migrainosus    Osteoarthritis of left shoulder    Other and unspecified ovarian cyst    OTHER DISEASES    lupus    Other malaise and fatigue    Other psoriasis    Other specified disease of sebaceous glands    PONV (postoperative nausea and vomiting)    Scleritis, unspecified    Tarsal tunnel syndrome    Tendonosis    Left shoulder    Visual impairment    glasses/contacts     PAST SURGICAL HISTORY:  Past Surgical History:   Procedure Laterality Date    Back surgery  3/23/16    C5-6-7    Colonoscopy  8/2009    Fluor gid & loclzj ndl/cath spi dx/ther njx N/A 10/14/2015    Procedure: CERVICAL EPIDURAL;  Surgeon: Timothy Garibay MD;  Location: [facility]    Fluor gid & loclzj ndl/cath spi dx/ther njx  11/11/2015    Procedure: ;  Surgeon: Timothy Garibay MD;  Location: [facility]    Injection, w/wo contrast, dx/therapeutic substance, epidural/subarachnoid; cervical/thoracic N/A 10/14/2015    Procedure: CERVICAL EPIDURAL;  Surgeon: Timothy Garibay MD;  Location: [facility]    Injection, w/wo contrast, dx/therapeutic substance, epidural/subarachnoid; cervical/thoracic N/A 11/11/2015    Procedure: CERVICAL EPIDURAL;  Surgeon: Timothy Garibay MD;  Location: [facility]     M-sedaj by  phys perfrmg [place] 5+ yr N/A 10/14/2015    Procedure: CERVICAL EPIDURAL;  Surgeon: Timothy Garibay MD;  Location: [facility]    [facility]  [facility]    Procedure: ;  Surgeon: Timothy Garibay MD;  Location: [facility]    Other      ovarian cystectomy    Other surgical history  2008    uterine ablation    Other surgical history  2007    fattty tumor removal, left flank    Tubal ligation       MEDICATIONS:  Current Outpatient Medications   Medication Sig Dispense Refill    ondansetron (ZOFRAN) 4 mg tablet Take 1 tablet po weekly for nausea after methotrexate, may repeat 1 tablet po every 8 hours      albuterol 108 (90 Base) MCG/ACT Inhalation Aero Soln Inhale 2 puffs into the lungs every 4 (four) hours as needed for Shortness of Breath. 1 each 0    amLODIPine 10 MG Oral Tab Take 1 tablet (10 mg total) by mouth daily. 90 tablet 3    butalbital-acetaminophen-caffeine -40 MG Oral Tab Take 1 tablet by mouth every 6 (six) hours as needed for Pain. 20 tablet 0    clobetasol 0.05 % External Solution Apply 5 mL topically 2 (two) times daily. 50 mL 0    fluticasone-salmeterol 250-50 MCG/ACT Inhalation Aerosol Powder, Breath Activated Inhale 1 puff into the lungs Q12H. 1 each 0    gabapentin 300 MG Oral Cap Take 1 capsule (300 mg total) by mouth 4 (four) times daily. 360 capsule 1    traMADol 50 MG Oral Tab Take 1 tablet (50 mg total) by mouth every 8 (eight) hours as needed for Pain. 60 tablet 0    guaiFENesin-Codeine 100-10 MG/5ML Oral Syrup 1 tsp po Q 4-6 hrs prn cough 236 mL 0    folic acid 1 MG Oral Tab       methotrexate 2.5 MG Oral Tab Take 3 tablets (7.5 mg total) by mouth once a week.      naproxen 500 MG Oral Tab Take 1 tablet (500 mg total) by mouth 2 (two) times daily.      tiZANidine 4 MG Oral Tab Take 1 tablet (4 mg total) by mouth nightly.      dexamethasone 0.1 % Ophthalmic Solution       fluocinonide 0.05 % External Cream        KETOCONAZOLE 2 % External Shampoo APPLY 5ML TOPICALLY TWICE A WEEK 120 mL 0    guaiFENesin-codeine (CHERATUSSIN AC) 100-10 MG/5ML Oral Solution Take 5 mL by mouth every 6 (six) hours as needed for cough or congestion. 236 mL 0    HYDROCORTISONE 2.5 % External Cream APPLY TOPICALLY TO THE FACE TWICE DAILY 28.35 g 0    BETAMETHASONE DIPROPIONATE 0.05 % External Cream apply topically to affected area twice daily, do not apply on face 45 g 0    hydroxychloroquine 200 MG Oral Tab       predniSONE 5 MG Oral Tab Take 1 tablet (5 mg total) by mouth daily. 90 tablet 3    fluticasone propionate 50 MCG/ACT Nasal Suspension 2 sprays by Each Nare route daily. 1 each 1     ALLERGIES:   Cellcept [mycophenolate]  FAMILY HISTORY  Family History   Problem Relation Age of Onset    Diabetes Mother     Hypertension Mother     Other (renal failure) Mother        PHYSICAL EXAM:  /62   Pulse 83   Resp 16   Ht 5' (1.524 m)   Wt 175 lb 6.4 oz (79.6 kg)   SpO2 98%   BMI 34.26 kg/m²     Heart regular rate and rhythm normal S1-S2.  Lungs are clear without crackles nor wheezes.  Abdomen positive bowel sounds no bruit.  Extremities 2+ pulses no edema.  She is tender in the ankles from her arthritis.  She is working with Dr. Feliz loya.  He is switched her to methotrexate.  On the left wrist is a tiny 2 to 3 mm bump deep to the skin.  It is mobile.  It does not move with flexion extension of the finger.    ASSESSMENT/PLAN:    1. Essential hypertension, benign  Well-controlled.  No change in medications  - amLODIPine 10 MG Oral Tab; Take 1 tablet (10 mg total) by mouth daily.  Dispense: 90 tablet; Refill: 3    2. Periodic headache syndrome, not intractable    - butalbital-acetaminophen-caffeine -40 MG Oral Tab; Take 1 tablet by mouth every 6 (six) hours as needed for Pain.  Dispense: 20 tablet; Refill: 0    3. Dermatitis    - clobetasol 0.05 % External Solution; Apply 5 mL topically 2 (two) times daily.  Dispense: 50 mL; Refill:  0    4. Bilateral sciatica    - gabapentin 300 MG Oral Cap; Take 1 capsule (300 mg total) by mouth 4 (four) times daily.  Dispense: 360 capsule; Refill: 1    5. Degenerative disc disease, lumbar    - traMADol 50 MG Oral Tab; Take 1 tablet (50 mg total) by mouth every 8 (eight) hours as needed for Pain.  Dispense: 60 tablet; Refill: 0    6. Chronic obstructive pulmonary disease, unspecified COPD type (HCC)    - albuterol 108 (90 Base) MCG/ACT Inhalation Aero Soln; Inhale 2 puffs into the lungs every 4 (four) hours as needed for Shortness of Breath.  Dispense: 1 each; Refill: 0  - fluticasone-salmeterol 250-50 MCG/ACT Inhalation Aerosol Powder, Breath Activated; Inhale 1 puff into the lungs Q12H.  Dispense: 1 each; Refill: 0  - guaiFENesin-Codeine 100-10 MG/5ML Oral Syrup; 1 tsp po Q 4-6 hrs prn cough  Dispense: 236 mL; Refill: 0    7. Ganglion cyst of wrist, left  We discussed if it gets large enough and stays painful, injection versus excision by orthopedics.         If this note is coded by time based on the Office/Outpatient Evaluation and Management Codes effective January 1, 2021, the time includes reviewing the chart before entering the exam room, the time spent with the patient in face to face discussion and examination, and the time documenting the visit.  It may also include time ordering tests or reviewing test results completed on the same day.

## 2024-09-06 ENCOUNTER — OFFICE VISIT (OUTPATIENT)
Dept: OBGYN CLINIC | Facility: CLINIC | Age: 59
End: 2024-09-06
Payer: COMMERCIAL

## 2024-09-06 VITALS
HEART RATE: 76 BPM | HEIGHT: 60 IN | DIASTOLIC BLOOD PRESSURE: 78 MMHG | WEIGHT: 177 LBS | BODY MASS INDEX: 34.75 KG/M2 | SYSTOLIC BLOOD PRESSURE: 126 MMHG

## 2024-09-06 DIAGNOSIS — N89.8 VAGINAL DISCHARGE: Primary | ICD-10-CM

## 2024-09-06 PROCEDURE — 99213 OFFICE O/P EST LOW 20 MIN: CPT | Performed by: OBSTETRICS & GYNECOLOGY

## 2024-09-06 RX ORDER — METRONIDAZOLE 7.5 MG/G
1 GEL VAGINAL NIGHTLY
Qty: 70 G | Refills: 0 | Status: SHIPPED | OUTPATIENT
Start: 2024-09-06 | End: 2024-09-11

## 2024-09-06 NOTE — PROGRESS NOTES
Subjective:  Chief Complaint   Patient presents with    Vaginal Problem     Pt c/o vaginal discharge that is \"mustard like in color and texture\". Pt states that symptoms have been ongoing since her last visit on 06/19/24.      Patient complains of mustard colored vaginal discharge for past 2-3 months.  Saw Saniya Rodríguez 6/2024 and had negative vaginitis panel, so was prescribed Luvina vaginal rinse, but had terrible burning with that product.  No odor or itching/irritation otherwise.  She denies recent antibiotic use.    Review of Systems:  Pertinent items are noted in the  HPI.    Objective:  /78   Pulse 76   Ht 60\"   Wt 177 lb (80.3 kg)   BMI 34.57 kg/m²   Physical Examination:  General appearance: Well dressed, well nourished in no apparent distress  Neurologic/Psychiatric: Alert and oriented to person, place and time, mood normal, affect appropriate  Pelvic exam:     External genitalia- Normal, Bartholin's, urethra, skeins glands normal   Vagina- No vaginal lesions,  scant slightly off white discharge    Assessment/Plan:  Vaginal discharge.  Recommend trial of metrogel vaginal for possible BV.  Avoid alcohol during treatment.    Diagnoses and all orders for this visit:    Vaginal discharge  -     metroNIDAZOLE 0.75 % Vaginal Gel; Place 1 Application vaginally nightly for 5 days.      Return if symptoms worsen or fail to improve.

## 2024-09-19 ENCOUNTER — PATIENT MESSAGE (OUTPATIENT)
Dept: OBGYN CLINIC | Facility: CLINIC | Age: 59
End: 2024-09-19

## 2024-09-20 ENCOUNTER — OFFICE VISIT (OUTPATIENT)
Dept: OBGYN CLINIC | Facility: CLINIC | Age: 59
End: 2024-09-20
Payer: COMMERCIAL

## 2024-09-20 VITALS
BODY MASS INDEX: 34 KG/M2 | DIASTOLIC BLOOD PRESSURE: 74 MMHG | HEART RATE: 101 BPM | SYSTOLIC BLOOD PRESSURE: 118 MMHG | WEIGHT: 173.81 LBS

## 2024-09-20 DIAGNOSIS — N89.8 VAGINAL DISCHARGE: Primary | ICD-10-CM

## 2024-09-20 PROCEDURE — 81514 NFCT DS BV&VAGINITIS DNA ALG: CPT | Performed by: OBSTETRICS & GYNECOLOGY

## 2024-09-20 PROCEDURE — 99213 OFFICE O/P EST LOW 20 MIN: CPT | Performed by: OBSTETRICS & GYNECOLOGY

## 2024-09-20 NOTE — TELEPHONE ENCOUNTER
Patient was seen for vaginal discharge on 9/6/2024 and was advised to return if symptoms worsened or failed to improve after treatment.  Patient reports no improvement and now has additional symptoms of spotting and frequent urination.  Offered appointment for evaluation today and patient accepts.

## 2024-09-20 NOTE — TELEPHONE ENCOUNTER
From: Jami Cross  To: Lucho Ibarra  Sent: 9/19/2024 11:21 PM CDT  Subject: Symptoms persists    Dr. Ibarra,    I completed the medication prescribed for possible yeast infection and thee condition is still there. There is still a light colored slimy texture discharge. Also some blood with first application of medicine and some with one wipe today. Slight pain in front and painful when using the medicine applicator.  I am extremely concerned and for some reason I have frequent urination - around 4 times a night.  Please let me know what possible steps to take.  Thanks,  Jami Cross

## 2024-09-20 NOTE — PROGRESS NOTES
Subjective:  Chief Complaint   Patient presents with    Other     Discharge and a bit of bleeding      Patient complains of persistent vaginal discomfort and discharge.  Used the 5 day course of Metrogel vaginal but extreme discomfort with application.  Finished the medication on Sunday.  Also had extreme discomfort with Luvina vaginal rinse that was prescribed by Saniya Rodríguez.     Review of Systems:  Pertinent items are noted in the  HPI.    Objective:  /74   Pulse 101   Wt 173 lb 12.8 oz (78.8 kg)   BMI 33.94 kg/m²   Physical Examination:  General appearance: Well dressed, well nourished in no apparent distress  Neurologic/Psychiatric: Alert and oriented to person, place and time, mood normal, affect appropriate  Pelvic exam:     External genitalia- Normal, Bartholin's, urethra, skeins glands normal   Vagina- No vaginal lesions, scant yellow/white discharge   Cervix- No lesions, long/closed, no cervical motion tenderness    Assessment/Plan:  Vaginal discomfort and discharge.  Check 3-in-1, await results.    Diagnoses and all orders for this visit:    Vaginal discharge  -     Vaginitis Vaginosis PCR Panel; Future

## 2024-10-21 ENCOUNTER — TELEPHONE (OUTPATIENT)
Dept: FAMILY MEDICINE CLINIC | Facility: CLINIC | Age: 59
End: 2024-10-21

## 2024-10-21 DIAGNOSIS — J44.9 CHRONIC OBSTRUCTIVE PULMONARY DISEASE, UNSPECIFIED COPD TYPE (HCC): ICD-10-CM

## 2024-10-21 RX ORDER — ALBUTEROL SULFATE 90 UG/1
2 INHALANT RESPIRATORY (INHALATION) EVERY 4 HOURS PRN
Qty: 1 EACH | Refills: 0 | Status: SHIPPED | OUTPATIENT
Start: 2024-10-21

## 2024-10-21 NOTE — TELEPHONE ENCOUNTER
albuterol 108 (90 Base) MCG/ACT Inhalation Aero Soln to [facility] in [place]

## 2024-11-12 LAB — ANA SER QL IF: NEGATIVE

## 2024-12-02 DIAGNOSIS — J44.9 CHRONIC OBSTRUCTIVE PULMONARY DISEASE, UNSPECIFIED COPD TYPE (HCC): ICD-10-CM

## 2024-12-02 NOTE — TELEPHONE ENCOUNTER
A refill request was received for:  Requested Prescriptions     Pending Prescriptions Disp Refills    fluticasone-salmeterol 250-50 MCG/ACT Inhalation Aerosol Powder, Breath Activated 1 each 0     Sig: Inhale 1 puff into the lungs Q12H.       Last refill date: 08/05/24      Last office visit:08/05/24     No future appointments.

## 2024-12-03 RX ORDER — FLUTICASONE PROPIONATE AND SALMETEROL 250; 50 UG/1; UG/1
1 POWDER RESPIRATORY (INHALATION) EVERY 12 HOURS
Qty: 1 EACH | Refills: 0 | Status: SHIPPED | OUTPATIENT
Start: 2024-12-03

## 2024-12-31 DIAGNOSIS — J44.9 CHRONIC OBSTRUCTIVE PULMONARY DISEASE, UNSPECIFIED COPD TYPE (HCC): ICD-10-CM

## 2024-12-31 RX ORDER — FLUTICASONE PROPIONATE AND SALMETEROL 250; 50 UG/1; UG/1
1 POWDER RESPIRATORY (INHALATION) EVERY 12 HOURS
Qty: 1 EACH | Refills: 0 | Status: SHIPPED | OUTPATIENT
Start: 2024-12-31

## 2025-01-02 ENCOUNTER — TELEPHONE (OUTPATIENT)
Dept: FAMILY MEDICINE CLINIC | Facility: CLINIC | Age: 60
End: 2025-01-02

## 2025-01-02 RX ORDER — CODEINE PHOSPHATE AND GUAIFENESIN 10; 100 MG/5ML; MG/5ML
SOLUTION ORAL
Qty: 236 ML | Refills: 0 | OUTPATIENT
Start: 2025-01-02

## 2025-01-02 NOTE — TELEPHONE ENCOUNTER
A refill request was received for:  Requested Prescriptions     Pending Prescriptions Disp Refills    GUAIFENESIN-CODEINE 100-10 MG/5ML Oral Solution [Pharmacy Med Name: guaiFENesin-Codeine Oral Solution 100-10 MG/5ML] 236 mL 0     Sig: TAKE 1-2 TEASPOONSFUL BY MOUTH EVERY 4 TO 6 HOURS AS NEEDED FOR COUGH       Last refill date:   9-14-22    Last office visit: 8-5-24 with     Follow up due:  Future Appointments   Date Time Provider Department Center   1/21/2025  3:00 PM Eugene Balderas DO [facility] [address] [facility] [address]

## 2025-01-02 NOTE — TELEPHONE ENCOUNTER
Patient calling stating that she need a refill on medication:     guaiFENesin-Codeine 100-10 MG/5ML Oral Syrup     Patient stated that she has been having this cough and really need some type of relief from coughing.    Patient stated that she is willing to do a video visit or come in if she need to.    Please send prescription to:   [facility] [id number] [place] [address] [address] [phone], [phone]       Please advise

## 2025-01-21 ENCOUNTER — OFFICE VISIT (OUTPATIENT)
Dept: FAMILY MEDICINE CLINIC | Facility: CLINIC | Age: 60
End: 2025-01-21
Payer: COMMERCIAL

## 2025-01-21 VITALS
WEIGHT: 175 LBS | DIASTOLIC BLOOD PRESSURE: 78 MMHG | RESPIRATION RATE: 16 BRPM | HEART RATE: 97 BPM | SYSTOLIC BLOOD PRESSURE: 122 MMHG | HEIGHT: 60 IN | OXYGEN SATURATION: 98 % | BODY MASS INDEX: 34.36 KG/M2

## 2025-01-21 DIAGNOSIS — E66.09 CLASS 1 OBESITY DUE TO EXCESS CALORIES WITH SERIOUS COMORBIDITY AND BODY MASS INDEX (BMI) OF 34.0 TO 34.9 IN ADULT: ICD-10-CM

## 2025-01-21 DIAGNOSIS — Z12.31 ENCOUNTER FOR SCREENING MAMMOGRAM FOR MALIGNANT NEOPLASM OF BREAST: ICD-10-CM

## 2025-01-21 DIAGNOSIS — Z23 NEED FOR VACCINATION FOR PNEUMOCOCCUS: ICD-10-CM

## 2025-01-21 DIAGNOSIS — G47.33 OSA (OBSTRUCTIVE SLEEP APNEA): ICD-10-CM

## 2025-01-21 DIAGNOSIS — E66.811 CLASS 1 OBESITY DUE TO EXCESS CALORIES WITH SERIOUS COMORBIDITY AND BODY MASS INDEX (BMI) OF 34.0 TO 34.9 IN ADULT: ICD-10-CM

## 2025-01-21 DIAGNOSIS — Z12.11 COLON CANCER SCREENING: ICD-10-CM

## 2025-01-21 DIAGNOSIS — E55.9 VITAMIN D DEFICIENCY: ICD-10-CM

## 2025-01-21 DIAGNOSIS — Z00.00 ANNUAL PHYSICAL EXAM: Primary | ICD-10-CM

## 2025-01-21 DIAGNOSIS — Z78.0 POST-MENOPAUSAL: ICD-10-CM

## 2025-01-21 DIAGNOSIS — R10.84 DIFFUSE ABDOMINAL PAIN: ICD-10-CM

## 2025-01-21 DIAGNOSIS — R71.8 MICROCYTOSIS: ICD-10-CM

## 2025-01-21 PROCEDURE — 99212 OFFICE O/P EST SF 10 MIN: CPT | Performed by: FAMILY MEDICINE

## 2025-01-21 PROCEDURE — 99396 PREV VISIT EST AGE 40-64: CPT | Performed by: FAMILY MEDICINE

## 2025-01-21 PROCEDURE — 90471 IMMUNIZATION ADMIN: CPT | Performed by: FAMILY MEDICINE

## 2025-01-21 PROCEDURE — 90677 PCV20 VACCINE IM: CPT | Performed by: FAMILY MEDICINE

## 2025-01-21 RX ORDER — TIRZEPATIDE 5 MG/.5ML
5 INJECTION, SOLUTION SUBCUTANEOUS WEEKLY
Qty: 2 ML | Refills: 5 | Status: SHIPPED | OUTPATIENT
Start: 2025-02-18

## 2025-01-21 RX ORDER — TIRZEPATIDE 2.5 MG/.5ML
2.5 INJECTION, SOLUTION SUBCUTANEOUS WEEKLY
Qty: 2 ML | Refills: 0 | Status: SHIPPED | OUTPATIENT
Start: 2025-01-21

## 2025-01-21 NOTE — H&P
Jami Cross is a 59 year old female who presents for a well woman physical exam:       Patient complains of:    + snoring.  + unrefreshing sleep.  + daytime somnolence.  + fatigue.  + apnea.    Obesity.  Unable to loose weight on her own.  Shoulder problems so hard to exercise.    HTN.  Chronic.  No CP/SOB.  No symptoms.  No side effects    Chronic diffuse abd pain for > 1 year.  + diarrhea and constipation.  No N/V.  No F/C.  No black or bloody stools. No change with eating.  Better after stooling.     Current Outpatient Medications   Medication Sig Dispense Refill    Tirzepatide-Weight Management (ZEPBOUND) 2.5 MG/0.5ML Subcutaneous Solution Auto-injector Inject 2.5 mg into the skin once a week. 2 mL 0    [START ON 2/18/2025] Tirzepatide-Weight Management (ZEPBOUND) 5 MG/0.5ML Subcutaneous Solution Auto-injector Inject 5 mg into the skin once a week. 2 mL 5    fluticasone-salmeterol 250-50 MCG/ACT Inhalation Aerosol Powder, Breath Activated Inhale 1 puff into the lungs every 12 hours 1 each 0    albuterol 108 (90 Base) MCG/ACT Inhalation Aero Soln Inhale 2 puffs into the lungs every 4 (four) hours as needed for Shortness of Breath. 1 each 0    ondansetron (ZOFRAN) 4 mg tablet Take 1 tablet po weekly for nausea after methotrexate, may repeat 1 tablet po every 8 hours      amLODIPine 10 MG Oral Tab Take 1 tablet (10 mg total) by mouth daily. 90 tablet 3    butalbital-acetaminophen-caffeine -40 MG Oral Tab Take 1 tablet by mouth every 6 (six) hours as needed for Pain. 20 tablet 0    clobetasol 0.05 % External Solution Apply 5 mL topically 2 (two) times daily. 50 mL 0    gabapentin 300 MG Oral Cap Take 1 capsule (300 mg total) by mouth 4 (four) times daily. 360 capsule 1    traMADol 50 MG Oral Tab Take 1 tablet (50 mg total) by mouth every 8 (eight) hours as needed for Pain. 60 tablet 0    folic acid 1 MG Oral Tab       methotrexate 2.5 MG Oral Tab Take 3 tablets (7.5 mg total) by mouth once a week.       naproxen 500 MG Oral Tab Take 1 tablet (500 mg total) by mouth 2 (two) times daily.      tiZANidine 4 MG Oral Tab Take 1 tablet (4 mg total) by mouth nightly.      dexamethasone 0.1 % Ophthalmic Solution       fluocinonide 0.05 % External Cream       KETOCONAZOLE 2 % External Shampoo APPLY 5ML TOPICALLY TWICE A WEEK 120 mL 0    guaiFENesin-codeine (CHERATUSSIN AC) 100-10 MG/5ML Oral Solution Take 5 mL by mouth every 6 (six) hours as needed for cough or congestion. 236 mL 0    HYDROCORTISONE 2.5 % External Cream APPLY TOPICALLY TO THE FACE TWICE DAILY 28.35 g 0    BETAMETHASONE DIPROPIONATE 0.05 % External Cream apply topically to affected area twice daily, do not apply on face 45 g 0    hydroxychloroquine 200 MG Oral Tab       predniSONE 5 MG Oral Tab Take 1 tablet (5 mg total) by mouth daily. 90 tablet 3    fluticasone propionate 50 MCG/ACT Nasal Suspension 2 sprays by Each Nare route daily. 1 each 1      Past Medical History:    ANEMIA    Brachial neuritis or radiculitis NOS    HEADACHES    HYPERTENSION    IBS (irritable bowel syndrome)    Migraine, unspecified, without mention of intractable migraine without mention of status migrainosus    Osteoarthritis of left shoulder    Other and unspecified ovarian cyst    OTHER DISEASES    lupus    Other malaise and fatigue    Other psoriasis    Other specified disease of sebaceous glands    PONV (postoperative nausea and vomiting)    Scleritis, unspecified    Tarsal tunnel syndrome    Tendonosis    Left shoulder    Visual impairment    glasses/contacts      Past Surgical History:   Procedure Laterality Date    Back surgery  3/23/16    C5-6-7    Colonoscopy  8/2009    Fluor gid & loclzj ndl/cath spi dx/ther njx N/A 10/14/2015    Procedure: CERVICAL EPIDURAL;  Surgeon: Timothy Garibay MD;  Location: [facility]    Fluor gid & loclzj ndl/cath spi dx/ther njx  11/11/2015    Procedure: ;  Surgeon: Timothy Garibay MD;  Location: [facility]     Injection, w/wo contrast, dx/therapeutic substance, epidural/subarachnoid; cervical/thoracic N/A 10/14/2015    Procedure: CERVICAL EPIDURAL;  Surgeon: Timothy Garibay MD;  Location: [facility]    Injection, w/wo contrast, dx/therapeutic substance, epidural/subarachnoid; cervical/thoracic N/A 11/11/2015    Procedure: CERVICAL EPIDURAL;  Surgeon: Timothy Garibay MD;  Location: [facility]    M-sedaj by  phys perfrmg svc 5+ yr N/A 10/14/2015    Procedure: CERVICAL EPIDURAL;  Surgeon: Timothy Garibay MD;  Location: [facility]    M-sedaj by  phys perfrmg svc 5+ yr  11/11/2015    Procedure: ;  Surgeon: Timothy Garibay MD;  Location: [facility]    Other      ovarian cystectomy    Other surgical history  2008    uterine ablation    Other surgical history  2007    fattty tumor removal, left flank    Tubal ligation        Family History   Problem Relation Age of Onset    Diabetes Mother     Hypertension Mother     Other (renal failure) Mother       Social History     Socioeconomic History    Marital status:    Tobacco Use    Smoking status: Never    Smokeless tobacco: Never   Vaping Use    Vaping status: Never Used   Substance and Sexual Activity    Alcohol use: No    Drug use: No    Sexual activity: Yes   Other Topics Concern    Caffeine Concern No    Exercise Yes    Seat Belt Yes            Osteoperosis Prevention was discussed.  Reviewed Calcium, Vitamin D supplementation and Weight Bearing Exercises.    Patient is not currently taking calcium and Vitamin D supplementation.        REVIEW OF SYSTEMS:   GENERAL: feels well otherwise  SKIN: denies any unusual skin lesions  EYES:denies blurred vision or double vision  HEENT: denies change in hearing or ear pain; denies change in vision; denies nasal congestion, sinus pain or ST  LUNGS: denies shortness of breath or chronic cough  CV: denies chest pain, pressure or palpitations  GI: +  chronic diffuse abdominal pain,denies heartburn  : denies dysuria; denies vaginal discharge or irritation; denies dyspareunia; denies dryness  MS: denies back pain  EXT: denies LE edema  NEURO: denies frequent headaches or dizziness  PSYCH: denies change in mood    EXAM:   /78   Pulse 97   Resp 16   Ht 5' (1.524 m)   Wt 175 lb (79.4 kg)   SpO2 98%   BMI 34.18 kg/m²   Body mass index is 34.18 kg/m².   GENERAL: well developed in no apparent distress  SKIN: no rash,no suspicious lesions  HEENT: atraumatic, normocephalic, TMs clear, nose and throat clear  EYES:PERRLA, EOMI, conjunctiva are clear  NECK: supple,no adenopathy, no thyromegaly  LUNGS: CTA, easy breathing  CV: S1 S2, RRR without murmur  BREASTS: Deferred.  Pt sees gynecologist.     GI: good BS's,no masses, HSM or tenderness  : Deferred.  Pt sees gynecologist.     MS: Pita, back is not tender  EXT: no edema  NEURO: Oriented times three,cranial nerves are intact,motor and sensory are grossly intact    ASSESSMENT AND PLAN:      1. Annual physical exam  - TSH W Reflex To Free T4 [E]    2. Need for vaccination for pneumococcus  - Prevnar 20 (PCV20) [41103]    3. Colon cancer screening  - COLOGUARD COLON CANCER SCREENING (EXTERNAL)    4. Microcytosis  - Ferritin  - Hemoglobin Electrophoresis w/Rflx Alpha,beta Chain [E]; Future    5. Post-menopausal  - XR DEXA BONE DENSITOMETRY (CPT=77080); Future    6. Vitamin D deficiency  - VITAMIN D, 25-HYDROXY [82102][Q]    7. Encounter for screening mammogram for malignant neoplasm of breast  - [facility] GENOVEVA 2D+3D SCREENING BILAT (CPT=77067/27612); Future    8. FREDY (obstructive sleep apnea)  - Diagnostic Sleep Study-split night PAP implemented if criteria met  - General sleep study; Future    9. Class 1 obesity due to excess calories with serious comorbidity and body mass index (BMI) of 34.0 to 34.9 in adult  - Tirzepatide-Weight Management (ZEPBOUND) 2.5 MG/0.5ML Subcutaneous Solution Auto-injector; Inject 2.5 mg  into the skin once a week.  Dispense: 2 mL; Refill: 0  - Tirzepatide-Weight Management (ZEPBOUND) 5 MG/0.5ML Subcutaneous Solution Auto-injector; Inject 5 mg into the skin once a week.  Dispense: 2 mL; Refill: 5        Meds & Refills for this Visit:  Requested Prescriptions     Signed Prescriptions Disp Refills    Tirzepatide-Weight Management (ZEPBOUND) 2.5 MG/0.5ML Subcutaneous Solution Auto-injector 2 mL 0     Sig: Inject 2.5 mg into the skin once a week.    Tirzepatide-Weight Management (ZEPBOUND) 5 MG/0.5ML Subcutaneous Solution Auto-injector 2 mL 5     Sig: Inject 5 mg into the skin once a week.       Jami was given an opportunity to ask questions and verbalized understanding of care.  Follow up 2 months or earlier if pain or not improving..

## 2025-01-27 DIAGNOSIS — L30.9 DERMATITIS: ICD-10-CM

## 2025-02-03 ENCOUNTER — TELEPHONE (OUTPATIENT)
Dept: FAMILY MEDICINE CLINIC | Facility: CLINIC | Age: 60
End: 2025-02-03

## 2025-02-03 RX ORDER — KETOCONAZOLE 20 MG/ML
1 SHAMPOO, SUSPENSION TOPICAL
Qty: 120 ML | Refills: 0 | Status: CANCELLED | OUTPATIENT
Start: 2025-02-03

## 2025-02-03 NOTE — TELEPHONE ENCOUNTER
Sandy calling to follow up on status of  KETOCONAZOLE 2 % External Shampoo     Is this the medication we are waiting for a PA on? Please advise. They would like a call back. says they have followed up already with us.

## 2025-02-03 NOTE — TELEPHONE ENCOUNTER
Does not appear PA was started,  please refill med to initiate PA.  Pt uses for scalp dermatitis  Last filled 6/16/23  Last ov 1/21/25

## 2025-02-04 RX ORDER — KETOCONAZOLE 20 MG/ML
SHAMPOO, SUSPENSION TOPICAL
Qty: 120 ML | Refills: 0 | Status: SHIPPED | OUTPATIENT
Start: 2025-02-04

## 2025-02-04 NOTE — TELEPHONE ENCOUNTER
Pt responded--she will use coupon.  RX pended for lela with note to pharm she will use goodrx  Please sign, seeing you 3/30 thx

## 2025-02-04 NOTE — TELEPHONE ENCOUNTER
Rhett shows this for $13.29 at [facility].  Msg sent to pt to see if she is open to this and we can send to a [facility] instead?  Will await her response to determine where to send. Rx  pended removed.

## 2025-02-06 ENCOUNTER — PATIENT MESSAGE (OUTPATIENT)
Dept: FAMILY MEDICINE CLINIC | Facility: CLINIC | Age: 60
End: 2025-02-06

## 2025-02-07 ENCOUNTER — TELEPHONE (OUTPATIENT)
Dept: FAMILY MEDICINE CLINIC | Facility: CLINIC | Age: 60
End: 2025-02-07

## 2025-02-07 DIAGNOSIS — G47.33 OSA (OBSTRUCTIVE SLEEP APNEA): Primary | ICD-10-CM

## 2025-02-07 DIAGNOSIS — E66.09 CLASS 1 OBESITY DUE TO EXCESS CALORIES WITH SERIOUS COMORBIDITY AND BODY MASS INDEX (BMI) OF 34.0 TO 34.9 IN ADULT: ICD-10-CM

## 2025-02-07 DIAGNOSIS — E66.811 CLASS 1 OBESITY DUE TO EXCESS CALORIES WITH SERIOUS COMORBIDITY AND BODY MASS INDEX (BMI) OF 34.0 TO 34.9 IN ADULT: ICD-10-CM

## 2025-02-07 NOTE — TELEPHONE ENCOUNTER
Please review previous message Tirzepatide-Weight Management (ZEPBOUND) 2.5 MG/0.5ML  requires PA.  Send to weight loss clinic?

## 2025-02-07 NOTE — TELEPHONE ENCOUNTER
Roseanne called from [facility] stating that the patient need a prior auth for the date of 01/21/2025   Tirzepatide-Weight Management (ZEPBOUND) 2.5 MG/0.5ML Subcutaneous Solution Auto-injector   patient has to be on the lower dose before they can fill the other prescription that they have already for 02/18/25   Medication Quantity Refills Start End   Tirzepatide-Weight Management (ZEPBOUND) 5 MG/0.5ML Subcutaneous Solution Auto-injector           Please send or provide a prior auth for the 01/21/2025 to:[id number] - [place], [place] - [facility] [address] [phone], [phone]     Please advise

## 2025-03-08 LAB
FERRITIN: 112 NG/ML (ref 16–288)
TSH W/REFLEX TO FT4: 1.43 MIU/L (ref 0.4–4.5)
VITAMIN D, 25-OH, TOTAL: 25 NG/ML (ref 30–100)

## 2025-03-20 ENCOUNTER — OFFICE VISIT (OUTPATIENT)
Dept: FAMILY MEDICINE CLINIC | Facility: CLINIC | Age: 60
End: 2025-03-20
Payer: COMMERCIAL

## 2025-03-20 VITALS
RESPIRATION RATE: 16 BRPM | SYSTOLIC BLOOD PRESSURE: 128 MMHG | BODY MASS INDEX: 32.79 KG/M2 | DIASTOLIC BLOOD PRESSURE: 80 MMHG | HEIGHT: 60 IN | WEIGHT: 167 LBS | HEART RATE: 96 BPM | OXYGEN SATURATION: 99 %

## 2025-03-20 DIAGNOSIS — E66.09 CLASS 1 OBESITY DUE TO EXCESS CALORIES WITH SERIOUS COMORBIDITY AND BODY MASS INDEX (BMI) OF 34.0 TO 34.9 IN ADULT: ICD-10-CM

## 2025-03-20 DIAGNOSIS — R05.3 CHRONIC COUGH: Primary | ICD-10-CM

## 2025-03-20 DIAGNOSIS — E66.811 CLASS 1 OBESITY DUE TO EXCESS CALORIES WITH SERIOUS COMORBIDITY AND BODY MASS INDEX (BMI) OF 34.0 TO 34.9 IN ADULT: ICD-10-CM

## 2025-03-20 PROCEDURE — 99214 OFFICE O/P EST MOD 30 MIN: CPT | Performed by: FAMILY MEDICINE

## 2025-03-20 RX ORDER — FLUTICASONE PROPIONATE 50 MCG
2 SPRAY, SUSPENSION (ML) NASAL DAILY
Qty: 1 EACH | Refills: 1 | Status: SHIPPED | OUTPATIENT
Start: 2025-03-20

## 2025-03-20 RX ORDER — PREDNISONE 20 MG/1
TABLET ORAL
Qty: 7 TABLET | Refills: 0 | Status: SHIPPED | OUTPATIENT
Start: 2025-03-20

## 2025-03-20 RX ORDER — OMEPRAZOLE 20 MG/1
CAPSULE, DELAYED RELEASE ORAL
Qty: 90 CAPSULE | Refills: 1 | Status: SHIPPED | OUTPATIENT
Start: 2025-03-20

## 2025-03-20 RX ORDER — PHENTERMINE HYDROCHLORIDE 15 MG/1
CAPSULE ORAL
Qty: 30 CAPSULE | Refills: 4 | Status: SHIPPED | OUTPATIENT
Start: 2025-03-20

## 2025-03-20 NOTE — PROGRESS NOTES
Subjective:   Patient ID: Jami Cross is a 60 year old female.    Chronic cough since 2016.  Intermittently.  Since getting trached.  Takes prednisone which fixes this.  Happens about ever other month.  Dry cough.  No sick contacts.      Obesity.  Chronic.  Phentermine 15mg only and already losing lots of weight.      History/Other:   Review of Systems   All other systems reviewed and are negative.    Current Outpatient Medications   Medication Sig Dispense Refill    predniSONE 20 MG Oral Tab 30 mg x 2 days, then 20mg x 3 days, then 10mg x 2 days, then stop. 7 tablet 0    omeprazole 20 MG Oral Capsule Delayed Release 30 min before meal.  Twice daily x 2 weeks, then once daily 90 capsule 1    fluticasone propionate 50 MCG/ACT Nasal Suspension 2 sprays by Each Nare route daily. 1 each 1    Phentermine HCl 15 MG Oral Cap 15mg daily AM 30 capsule 4    ergocalciferol 1.25 MG (73964 UT) Oral Cap Take 1 capsule (50,000 Units total) by mouth once a week. Once weekly x 20 weeks.  Take with food 20 capsule 0    Phentermine HCl 15 MG Oral Cap 15mg daily AM x 2 weeks, then 37.5mg daily AM x 6 months, then 15mg daily AM x 2 weeks, then stop 30 capsule 0    ketoconazole 2 % External Shampoo APPLY 5ML TOPICALLY TWICE A WEEK 120 mL 0    fluticasone-salmeterol 250-50 MCG/ACT Inhalation Aerosol Powder, Breath Activated Inhale 1 puff into the lungs every 12 hours 1 each 0    albuterol 108 (90 Base) MCG/ACT Inhalation Aero Soln Inhale 2 puffs into the lungs every 4 (four) hours as needed for Shortness of Breath. 1 each 0    ondansetron (ZOFRAN) 4 mg tablet Take 1 tablet po weekly for nausea after methotrexate, may repeat 1 tablet po every 8 hours      amLODIPine 10 MG Oral Tab Take 1 tablet (10 mg total) by mouth daily. 90 tablet 3    butalbital-acetaminophen-caffeine -40 MG Oral Tab Take 1 tablet by mouth every 6 (six) hours as needed for Pain. 20 tablet 0    clobetasol 0.05 % External Solution Apply 5 mL topically 2 (two)  times daily. 50 mL 0    gabapentin 300 MG Oral Cap Take 1 capsule (300 mg total) by mouth 4 (four) times daily. 360 capsule 1    traMADol 50 MG Oral Tab Take 1 tablet (50 mg total) by mouth every 8 (eight) hours as needed for Pain. 60 tablet 0    folic acid 1 MG Oral Tab       methotrexate 2.5 MG Oral Tab Take 3 tablets (7.5 mg total) by mouth once a week.      naproxen 500 MG Oral Tab Take 1 tablet (500 mg total) by mouth 2 (two) times daily.      tiZANidine 4 MG Oral Tab Take 1 tablet (4 mg total) by mouth nightly.      dexamethasone 0.1 % Ophthalmic Solution       fluocinonide 0.05 % External Cream       guaiFENesin-codeine (CHERATUSSIN AC) 100-10 MG/5ML Oral Solution Take 5 mL by mouth every 6 (six) hours as needed for cough or congestion. 236 mL 0    HYDROCORTISONE 2.5 % External Cream APPLY TOPICALLY TO THE FACE TWICE DAILY 28.35 g 0    BETAMETHASONE DIPROPIONATE 0.05 % External Cream apply topically to affected area twice daily, do not apply on face 45 g 0    hydroxychloroquine 200 MG Oral Tab       predniSONE 5 MG Oral Tab Take 1 tablet (5 mg total) by mouth daily. 90 tablet 3     Allergies:Allergies[1]    Objective:   Physical Exam  Vitals reviewed.   Constitutional:       General: She is not in acute distress.     Appearance: She is well-developed. She is not diaphoretic.   Eyes:      General: No scleral icterus.        Right eye: No discharge.         Left eye: No discharge.      Conjunctiva/sclera: Conjunctivae normal.   Cardiovascular:      Rate and Rhythm: Normal rate and regular rhythm.      Heart sounds: Normal heart sounds. No murmur heard.     No friction rub. No gallop.   Pulmonary:      Effort: Pulmonary effort is normal. No respiratory distress.      Breath sounds: Normal breath sounds. No wheezing or rales.         Assessment & Plan:   1. Chronic cough    2. Class 1 obesity due to excess calories with serious comorbidity and body mass index (BMI) of 34.0 to 34.9 in adult      1. Chronic  cough  - predniSONE 20 MG Oral Tab; 30 mg x 2 days, then 20mg x 3 days, then 10mg x 2 days, then stop.  Dispense: 7 tablet; Refill: 0  - omeprazole 20 MG Oral Capsule Delayed Release; 30 min before meal.  Twice daily x 2 weeks, then once daily  Dispense: 90 capsule; Refill: 1  - fluticasone propionate 50 MCG/ACT Nasal Suspension; 2 sprays by Each Nare route daily.  Dispense: 1 each; Refill: 1    2. Class 1 obesity due to excess calories with serious comorbidity and body mass index (BMI) of 34.0 to 34.9 in adult  - Phentermine HCl 15 MG Oral Cap; 15mg daily AM  Dispense: 30 capsule; Refill: 4      Meds This Visit:  Requested Prescriptions     Signed Prescriptions Disp Refills    predniSONE 20 MG Oral Tab 7 tablet 0     Si mg x 2 days, then 20mg x 3 days, then 10mg x 2 days, then stop.    omeprazole 20 MG Oral Capsule Delayed Release 90 capsule 1     Si min before meal.  Twice daily x 2 weeks, then once daily    fluticasone propionate 50 MCG/ACT Nasal Suspension 1 each 1     Si sprays by Each Nare route daily.    Phentermine HCl 15 MG Oral Cap 30 capsule 4     Sig: 15mg daily AM       Imaging & Referrals:  None         [1]   Allergies  Allergen Reactions    Cellcept [Mycophenolate] RASH

## 2025-04-20 ENCOUNTER — PATIENT MESSAGE (OUTPATIENT)
Dept: FAMILY MEDICINE CLINIC | Facility: CLINIC | Age: 60
End: 2025-04-20

## 2025-04-20 DIAGNOSIS — M51.369 DEGENERATIVE DISC DISEASE, LUMBAR: ICD-10-CM

## 2025-04-22 RX ORDER — TRAMADOL HYDROCHLORIDE 50 MG/1
50 TABLET ORAL EVERY 8 HOURS PRN
Qty: 60 TABLET | Refills: 0 | Status: SHIPPED | OUTPATIENT
Start: 2025-04-22

## 2025-04-30 ENCOUNTER — PATIENT MESSAGE (OUTPATIENT)
Dept: FAMILY MEDICINE CLINIC | Facility: CLINIC | Age: 60
End: 2025-04-30

## 2025-04-30 DIAGNOSIS — R05.3 CHRONIC COUGH: ICD-10-CM

## 2025-04-30 DIAGNOSIS — E66.09 CLASS 1 OBESITY DUE TO EXCESS CALORIES WITH SERIOUS COMORBIDITY AND BODY MASS INDEX (BMI) OF 34.0 TO 34.9 IN ADULT: ICD-10-CM

## 2025-04-30 DIAGNOSIS — E66.811 CLASS 1 OBESITY DUE TO EXCESS CALORIES WITH SERIOUS COMORBIDITY AND BODY MASS INDEX (BMI) OF 34.0 TO 34.9 IN ADULT: ICD-10-CM

## 2025-04-30 RX ORDER — PREDNISONE 20 MG/1
TABLET ORAL
Qty: 7 TABLET | Refills: 0 | Status: CANCELLED
Start: 2025-04-30

## 2025-04-30 RX ORDER — PHENTERMINE HYDROCHLORIDE 15 MG/1
CAPSULE ORAL
Qty: 30 CAPSULE | Refills: 0 | Status: CANCELLED
Start: 2025-04-30

## 2025-04-30 NOTE — TELEPHONE ENCOUNTER
Approve/deny  Phentermine  Last filled 3/20/25  Last visit 3/20/25    Also patient requesting refill of prednisone  Patient states cough is still persisting please advise if you would like to refill    Patient states she is out of town and needs meds called into [facility]  [id number]  [address] [address] [phone]    Route back to triage if okay to refill

## 2025-05-01 NOTE — TELEPHONE ENCOUNTER
Please call patient and confirm pharmacy. I could not find the pharmacy in [place] that the patient listed. Once pharmacy updated refill can be sent over.

## 2025-05-05 NOTE — TELEPHONE ENCOUNTER
Called pharmacy, technically it is [facility] but address is [place]. Entered Pharmacy

## 2025-05-06 RX ORDER — PREDNISONE 20 MG/1
TABLET ORAL
Qty: 7 TABLET | Refills: 0 | Status: SHIPPED | OUTPATIENT
Start: 2025-05-06

## 2025-05-06 RX ORDER — PHENTERMINE HYDROCHLORIDE 15 MG/1
CAPSULE ORAL
Qty: 30 CAPSULE | Refills: 4 | Status: SHIPPED | OUTPATIENT
Start: 2025-05-06

## 2025-05-20 ENCOUNTER — OFFICE VISIT (OUTPATIENT)
Dept: FAMILY MEDICINE CLINIC | Facility: CLINIC | Age: 60
End: 2025-05-20
Payer: COMMERCIAL

## 2025-05-20 VITALS
BODY MASS INDEX: 32.59 KG/M2 | HEART RATE: 70 BPM | RESPIRATION RATE: 15 BRPM | SYSTOLIC BLOOD PRESSURE: 128 MMHG | WEIGHT: 166 LBS | HEIGHT: 60 IN | DIASTOLIC BLOOD PRESSURE: 70 MMHG | OXYGEN SATURATION: 98 %

## 2025-05-20 DIAGNOSIS — R05.3 CHRONIC COUGH: Primary | ICD-10-CM

## 2025-05-20 DIAGNOSIS — J44.9 CHRONIC OBSTRUCTIVE PULMONARY DISEASE, UNSPECIFIED COPD TYPE (HCC): ICD-10-CM

## 2025-05-20 PROCEDURE — 99213 OFFICE O/P EST LOW 20 MIN: CPT | Performed by: FAMILY MEDICINE

## 2025-05-20 RX ORDER — FLUTICASONE PROPIONATE AND SALMETEROL 250; 50 UG/1; UG/1
1 POWDER RESPIRATORY (INHALATION) EVERY 12 HOURS
Qty: 1 EACH | Refills: 0 | Status: SHIPPED | OUTPATIENT
Start: 2025-05-20

## 2025-05-20 RX ORDER — ALBUTEROL SULFATE 90 UG/1
2 INHALANT RESPIRATORY (INHALATION) EVERY 4 HOURS PRN
Qty: 1 EACH | Refills: 0 | Status: SHIPPED | OUTPATIENT
Start: 2025-05-20

## 2025-05-20 RX ORDER — AZITHROMYCIN 250 MG/1
TABLET, FILM COATED ORAL
Qty: 6 TABLET | Refills: 0 | Status: SHIPPED | OUTPATIENT
Start: 2025-05-20 | End: 2025-05-25

## 2025-05-20 NOTE — PROGRESS NOTES
[facility] Progress Note    SUBJECTIVE: Jami Cross 60 year old female is here today for   Chief Complaint   Patient presents with    Cough     Started about 2 weeks ago for awhile -it will get better and then it will get better and then come back   Makes her joint pain from lupus worse. Sore throat from coughing so much -has been using Flonase and inhaler        Gets a chronic cough, will flare up every while, will last 1.5 months, and then calm down for a month or so. Took prednisone, flonase, inhaler and PPI and gets some relief. Wakes her up from her sleep    Has lupus, and it seems joints flare up with pain during this.    Pulmonology brought up to consider.    Feeling worn down from the frequent coughing.    Has a dry cough.    Also takes guaifenesin    Dates her chronic health issues, to when she had a trach needed in 2016, was there for a surgery and complicated with hemorrhage, and needed trach.         PMH  Past Medical History[1]     PSH  Past Surgical History[2]     Social Hx:  No changes    ROS  See HPI    OBJECTIVE:  /70   Pulse 70   Resp 15   Ht 5' (1.524 m)   Wt 166 lb (75.3 kg)   SpO2 98%   BMI 32.42 kg/m²     Exam  Gen: No acute distress, alert and oriented x3, no focal neurologic deficits  ENT: PERRLA, EOMI, TM clear  Resp: clear to auscultation bilaterally      Labs:          Meds:   Current Medications[3]      Assessment/Plan  Jami was seen today for cough.    Diagnoses and all orders for this visit:    Chronic cough  -     azithromycin 250 MG Oral Tab; Take 2 tablets (500 mg total) by mouth daily for 1 day, THEN 1 tablet (250 mg total) daily for 4 days.  -     Pulmonary Referral - In Network    Chronic obstructive pulmonary disease, unspecified COPD type (HCC)  -     fluticasone-salmeterol 250-50 MCG/ACT Inhalation Aerosol Powder, Breath Activated; Inhale 1 puff into the lungs in the morning and 1 puff in the evening.  -     albuterol 108 (90 Base) MCG/ACT Inhalation  Aero Soln; Inhale 2 puffs into the lungs every 4 (four) hours as needed for Shortness of Breath.         Never treated for possible infection, will trial treatmnt, and if not improving recommend pulmonary consult due to length of time this has been going on.         Total Time spent with patient and coordinating care:  25 minutes.    Follow up: as needed      Fox Daley MD         [1]   Past Medical History:   ANEMIA    Brachial neuritis or radiculitis NOS    HEADACHES    HYPERTENSION    IBS (irritable bowel syndrome)    Migraine, unspecified, without mention of intractable migraine without mention of status migrainosus    Osteoarthritis of left shoulder    Other and unspecified ovarian cyst    OTHER DISEASES    lupus    Other malaise and fatigue    Other psoriasis    Other specified disease of sebaceous glands    PONV (postoperative nausea and vomiting)    Scleritis, unspecified    Tarsal tunnel syndrome    Tendonosis    Left shoulder    Visual impairment    glasses/contacts   [2]   Past Surgical History:  Procedure Laterality Date    Back surgery  3/23/16    C5-6-7    Colonoscopy  8/2009    Fluor gid & loclzj ndl/cath spi dx/ther njx N/A 10/14/2015    Procedure: CERVICAL EPIDURAL;  Surgeon: Timothy Garibay MD;  Location: [facility]    Fluor gid & loclzj ndl/cath spi dx/ther njx  11/11/2015    Procedure: ;  Surgeon: Timothy Garibay MD;  Location: [facility]    Injection, w/wo contrast, dx/therapeutic substance, epidural/subarachnoid; cervical/thoracic N/A 10/14/2015    Procedure: CERVICAL EPIDURAL;  Surgeon: Timothy Garibay MD;  Location: [facility]    Injection, w/wo contrast, dx/therapeutic substance, epidural/subarachnoid; cervical/thoracic N/A 11/11/2015    Procedure: CERVICAL EPIDURAL;  Surgeon: Timothy Garibay MD;  Location: [facility]    M-sedaj by  phys perfrmg svc 5+ yr N/A 10/14/2015    Procedure: CERVICAL EPIDURAL;   Surgeon: Timothy Garibay MD;  Location: [facility]    M-sedaj by sm phys perfrmg svc 5+ yr  11/11/2015    Procedure: ;  Surgeon: Timothy Garibay MD;  Location: [facility]    Other      ovarian cystectomy    Other surgical history  2008    uterine ablation    Other surgical history  2007    fattty tumor removal, left flank    Tubal ligation     [3]   Current Outpatient Medications   Medication Sig Dispense Refill    azithromycin 250 MG Oral Tab Take 2 tablets (500 mg total) by mouth daily for 1 day, THEN 1 tablet (250 mg total) daily for 4 days. 6 tablet 0    fluticasone-salmeterol 250-50 MCG/ACT Inhalation Aerosol Powder, Breath Activated Inhale 1 puff into the lungs in the morning and 1 puff in the evening. 1 each 0    albuterol 108 (90 Base) MCG/ACT Inhalation Aero Soln Inhale 2 puffs into the lungs every 4 (four) hours as needed for Shortness of Breath. 1 each 0    traMADol 50 MG Oral Tab Take 1 tablet (50 mg total) by mouth every 8 (eight) hours as needed for Pain. 60 tablet 0    tiZANidine 4 MG Oral Tab Take 1 tablet (4 mg total) by mouth nightly.      predniSONE 20 MG Oral Tab 30 mg x 2 days, then 20mg x 3 days, then 10mg x 2 days, then stop. 7 tablet 0    Phentermine HCl 15 MG Oral Cap 15mg daily AM 30 capsule 4    omeprazole 20 MG Oral Capsule Delayed Release 30 min before meal.  Twice daily x 2 weeks, then once daily 90 capsule 1    fluticasone propionate 50 MCG/ACT Nasal Suspension 2 sprays by Each Nare route daily. 1 each 1    ergocalciferol 1.25 MG (52868 UT) Oral Cap Take 1 capsule (50,000 Units total) by mouth once a week. Once weekly x 20 weeks.  Take with food 20 capsule 0    Phentermine HCl 15 MG Oral Cap 15mg daily AM x 2 weeks, then 37.5mg daily AM x 6 months, then 15mg daily AM x 2 weeks, then stop 30 capsule 0    ketoconazole 2 % External Shampoo APPLY 5ML TOPICALLY TWICE A WEEK 120 mL 0    ondansetron (ZOFRAN) 4 mg tablet Take 1 tablet po weekly for  nausea after methotrexate, may repeat 1 tablet po every 8 hours      amLODIPine 10 MG Oral Tab Take 1 tablet (10 mg total) by mouth daily. 90 tablet 3    butalbital-acetaminophen-caffeine -40 MG Oral Tab Take 1 tablet by mouth every 6 (six) hours as needed for Pain. 20 tablet 0    clobetasol 0.05 % External Solution Apply 5 mL topically 2 (two) times daily. 50 mL 0    gabapentin 300 MG Oral Cap Take 1 capsule (300 mg total) by mouth 4 (four) times daily. 360 capsule 1    folic acid 1 MG Oral Tab       methotrexate 2.5 MG Oral Tab Take 3 tablets (7.5 mg total) by mouth once a week.      naproxen 500 MG Oral Tab Take 1 tablet (500 mg total) by mouth 2 (two) times daily.      dexamethasone 0.1 % Ophthalmic Solution       fluocinonide 0.05 % External Cream       guaiFENesin-codeine (CHERATUSSIN AC) 100-10 MG/5ML Oral Solution Take 5 mL by mouth every 6 (six) hours as needed for cough or congestion. 236 mL 0    HYDROCORTISONE 2.5 % External Cream APPLY TOPICALLY TO THE FACE TWICE DAILY 28.35 g 0    BETAMETHASONE DIPROPIONATE 0.05 % External Cream apply topically to affected area twice daily, do not apply on face 45 g 0    hydroxychloroquine 200 MG Oral Tab

## 2025-05-23 ENCOUNTER — HOSPITAL ENCOUNTER (OUTPATIENT)
Dept: CT IMAGING | Facility: HOSPITAL | Age: 60
Discharge: HOME | End: 2025-05-23
Attending: FAMILY MEDICINE
Payer: COMMERCIAL

## 2025-05-23 DIAGNOSIS — R10.84 DIFFUSE ABDOMINAL PAIN: ICD-10-CM

## 2025-05-23 LAB
CREAT BLD-MCNC: 0.9 MG/DL (ref 0.55–1.02)
EGFRCR SERPLBLD CKD-EPI 2021: 72 ML/MIN/1.73M2 (ref 60–?)

## 2025-05-23 PROCEDURE — 82565 ASSAY OF CREATININE: CPT

## 2025-05-23 PROCEDURE — 74177 CT ABD & PELVIS W/CONTRAST: CPT | Performed by: FAMILY MEDICINE

## 2025-06-05 ENCOUNTER — HOSPITAL ENCOUNTER (OUTPATIENT)
Dept: MAMMOGRAPHY | Age: 60
Discharge: HOME | End: 2025-06-05
Attending: FAMILY MEDICINE
Payer: COMMERCIAL

## 2025-06-05 ENCOUNTER — HOSPITAL ENCOUNTER (OUTPATIENT)
Dept: BONE DENSITY | Age: 60
Discharge: HOME | End: 2025-06-05
Attending: FAMILY MEDICINE
Payer: COMMERCIAL

## 2025-06-05 DIAGNOSIS — Z12.31 ENCOUNTER FOR SCREENING MAMMOGRAM FOR MALIGNANT NEOPLASM OF BREAST: ICD-10-CM

## 2025-06-05 DIAGNOSIS — Z78.0 POST-MENOPAUSAL: ICD-10-CM

## 2025-06-05 PROCEDURE — 77063 BREAST TOMOSYNTHESIS BI: CPT | Performed by: FAMILY MEDICINE

## 2025-06-05 PROCEDURE — 77067 SCR MAMMO BI INCL CAD: CPT | Performed by: FAMILY MEDICINE

## 2025-06-05 PROCEDURE — 77080 DXA BONE DENSITY AXIAL: CPT | Performed by: FAMILY MEDICINE

## 2025-06-14 DIAGNOSIS — J44.9 CHRONIC OBSTRUCTIVE PULMONARY DISEASE, UNSPECIFIED COPD TYPE (HCC): ICD-10-CM

## 2025-06-14 RX ORDER — FLUTICASONE PROPIONATE AND SALMETEROL 250; 50 UG/1; UG/1
POWDER RESPIRATORY (INHALATION)
Qty: 60 EACH | Refills: 0 | Status: SHIPPED | OUTPATIENT
Start: 2025-06-14

## 2025-06-17 ENCOUNTER — OFFICE VISIT (OUTPATIENT)
Dept: FAMILY MEDICINE CLINIC | Facility: CLINIC | Age: 60
End: 2025-06-17
Payer: COMMERCIAL

## 2025-06-17 VITALS
BODY MASS INDEX: 32.75 KG/M2 | WEIGHT: 166.81 LBS | DIASTOLIC BLOOD PRESSURE: 78 MMHG | HEART RATE: 102 BPM | RESPIRATION RATE: 16 BRPM | HEIGHT: 60 IN | SYSTOLIC BLOOD PRESSURE: 122 MMHG | OXYGEN SATURATION: 98 %

## 2025-06-17 DIAGNOSIS — B35.4 TINEA CORPORIS: ICD-10-CM

## 2025-06-17 DIAGNOSIS — E66.811 CLASS 1 OBESITY WITH SERIOUS COMORBIDITY AND BODY MASS INDEX (BMI) OF 32.0 TO 32.9 IN ADULT, UNSPECIFIED OBESITY TYPE: Primary | ICD-10-CM

## 2025-06-17 DIAGNOSIS — K59.00 CONSTIPATION, UNSPECIFIED CONSTIPATION TYPE: ICD-10-CM

## 2025-06-17 DIAGNOSIS — L30.9 DERMATITIS: ICD-10-CM

## 2025-06-17 DIAGNOSIS — N28.1 KIDNEY CYST, ACQUIRED: ICD-10-CM

## 2025-06-17 PROCEDURE — 99214 OFFICE O/P EST MOD 30 MIN: CPT | Performed by: FAMILY MEDICINE

## 2025-06-17 RX ORDER — TRIAMCINOLONE ACETONIDE 1 MG/G
1 CREAM TOPICAL 2 TIMES DAILY PRN
Qty: 1 EACH | Refills: 1 | Status: SHIPPED | OUTPATIENT
Start: 2025-06-17

## 2025-06-17 RX ORDER — CLOBETASOL PROPIONATE 0.5 MG/ML
SOLUTION TOPICAL
Qty: 50 ML | Refills: 3 | Status: SHIPPED | OUTPATIENT
Start: 2025-06-17

## 2025-06-17 RX ORDER — NYSTATIN 100000 U/G
1 CREAM TOPICAL 2 TIMES DAILY
Qty: 1 EACH | Refills: 1 | Status: SHIPPED | OUTPATIENT
Start: 2025-06-17

## 2025-06-17 RX ORDER — PHENTERMINE HYDROCHLORIDE 37.5 MG/1
37.5 CAPSULE ORAL EVERY MORNING
Qty: 30 CAPSULE | Refills: 3 | Status: SHIPPED | OUTPATIENT
Start: 2025-06-17

## 2025-06-17 NOTE — PROGRESS NOTES
Subjective:   Patient ID: Jami Cross is a 60 year old female.  Chronic obesity, on phentermine, helping to reduce weight.    Cough, chronic, seeing pulmonologist.    Constipation, chronic, taking miralax everyday. It helps.    CT abdomen, Rt kidney cyst, 3.5 cm in size., needs reference for the urologist.    Bone scan, osteopenia mostly in back.    Dermatitis, on scalp, elbows and under the breast area.            History/Other:   Review of Systems   All other systems reviewed and are negative.    Current Medications[1]  Allergies:Allergies[2]    Objective:   Physical Exam  Constitutional:       Appearance: She is well-developed.   Cardiovascular:      Rate and Rhythm: Normal rate and regular rhythm.      Heart sounds: Normal heart sounds.   Pulmonary:      Effort: Pulmonary effort is normal.      Breath sounds: Normal breath sounds.   Abdominal:      General: Bowel sounds are normal.      Palpations: Abdomen is soft.   Skin:     General: Skin is warm and dry.   Neurological:      Mental Status: She is alert.      Deep Tendon Reflexes: Reflexes are normal and symmetric.         Assessment & Plan:   1. Class 1 obesity with serious comorbidity and body mass index (BMI) of 32.0 to 32.9 in adult, unspecified obesity type    2. Kidney cyst, acquired    3. Constipation, unspecified constipation type    4. Dermatitis    5. Tinea corporis      1. Class 1 obesity with serious comorbidity and body mass index (BMI) of 32.0 to 32.9 in adult, unspecified obesity type  - Phentermine HCl 37.5 MG Oral Cap; Take 1 capsule (37.5 mg total) by mouth every morning.  Dispense: 30 capsule; Refill: 3    2. Kidney cyst, acquired  -Continue F/U with Urologist.    3. Constipation, unspecified constipation type  -Chronic, stable. CPM    4. Dermatitis  - triamcinolone 0.1 % External Cream; Apply 1 Application topically 2 (two) times daily as needed. Up to 2 weeks, then 1 week break before restarting this cycle as needed.  Dispense: 1 each;  Refill: 1  - clobetasol 0.05 % External Solution; Use twice weekly as needed.  Dispense: 50 mL; Refill: 3    5. Tinea corporis  - nystatin 100,000 Units/g External Cream; Apply 1 Application topically 2 (two) times daily.  Dispense: 1 each; Refill: 1        Meds This Visit:  Requested Prescriptions     Signed Prescriptions Disp Refills    Phentermine HCl 37.5 MG Oral Cap 30 capsule 3     Sig: Take 1 capsule (37.5 mg total) by mouth every morning.    triamcinolone 0.1 % External Cream 1 each 1     Sig: Apply 1 Application topically 2 (two) times daily as needed. Up to 2 weeks, then 1 week break before restarting this cycle as needed.    nystatin 100,000 Units/g External Cream 1 each 1     Sig: Apply 1 Application topically 2 (two) times daily.    clobetasol 0.05 % External Solution 50 mL 3     Sig: Use twice weekly as needed.       Imaging & Referrals:  None    Note written by marcello.  Scribe is in the room with me.  Scribe has written note according to my dictation.  Reviewed scribe note.  Changed as appropriate.         [1]   Current Outpatient Medications   Medication Sig Dispense Refill    Phentermine HCl 37.5 MG Oral Cap Take 1 capsule (37.5 mg total) by mouth every morning. 30 capsule 3    triamcinolone 0.1 % External Cream Apply 1 Application topically 2 (two) times daily as needed. Up to 2 weeks, then 1 week break before restarting this cycle as needed. 1 each 1    nystatin 100,000 Units/g External Cream Apply 1 Application topically 2 (two) times daily. 1 each 1    clobetasol 0.05 % External Solution Use twice weekly as needed. 50 mL 3    fluticasone-salmeterol 250-50 MCG/ACT Inhalation Aerosol Powder, Breath Activated Inhale 1 puff by mouth into the lungs in the morning and 1 puff in the evening. 60 each 0    albuterol 108 (90 Base) MCG/ACT Inhalation Aero Soln Inhale 2 puffs into the lungs every 4 (four) hours as needed for Shortness of Breath. 1 each 0    predniSONE 20 MG Oral Tab 30 mg x 2 days, then  20mg x 3 days, then 10mg x 2 days, then stop. 7 tablet 0    traMADol 50 MG Oral Tab Take 1 tablet (50 mg total) by mouth every 8 (eight) hours as needed for Pain. 60 tablet 0    omeprazole 20 MG Oral Capsule Delayed Release 30 min before meal.  Twice daily x 2 weeks, then once daily 90 capsule 1    fluticasone propionate 50 MCG/ACT Nasal Suspension 2 sprays by Each Nare route daily. 1 each 1    ergocalciferol 1.25 MG (96328 UT) Oral Cap Take 1 capsule (50,000 Units total) by mouth once a week. Once weekly x 20 weeks.  Take with food 20 capsule 0    ketoconazole 2 % External Shampoo APPLY 5ML TOPICALLY TWICE A WEEK 120 mL 0    ondansetron (ZOFRAN) 4 mg tablet Take 1 tablet po weekly for nausea after methotrexate, may repeat 1 tablet po every 8 hours      amLODIPine 10 MG Oral Tab Take 1 tablet (10 mg total) by mouth daily. 90 tablet 3    butalbital-acetaminophen-caffeine -40 MG Oral Tab Take 1 tablet by mouth every 6 (six) hours as needed for Pain. 20 tablet 0    gabapentin 300 MG Oral Cap Take 1 capsule (300 mg total) by mouth 4 (four) times daily. 360 capsule 1    folic acid 1 MG Oral Tab       methotrexate 2.5 MG Oral Tab Take 3 tablets (7.5 mg total) by mouth once a week.      naproxen 500 MG Oral Tab Take 1 tablet (500 mg total) by mouth 2 (two) times daily.      tiZANidine 4 MG Oral Tab Take 1 tablet (4 mg total) by mouth nightly.      dexamethasone 0.1 % Ophthalmic Solution       guaiFENesin-codeine (CHERATUSSIN AC) 100-10 MG/5ML Oral Solution Take 5 mL by mouth every 6 (six) hours as needed for cough or congestion. 236 mL 0    hydroxychloroquine 200 MG Oral Tab      [2]   Allergies  Allergen Reactions    Cellcept [Mycophenolate] RASH

## 2025-06-20 ENCOUNTER — OFFICE VISIT (OUTPATIENT)
Facility: CLINIC | Age: 60
End: 2025-06-20
Payer: COMMERCIAL

## 2025-06-20 VITALS
HEIGHT: 60 IN | SYSTOLIC BLOOD PRESSURE: 128 MMHG | DIASTOLIC BLOOD PRESSURE: 86 MMHG | BODY MASS INDEX: 32.59 KG/M2 | HEART RATE: 100 BPM | OXYGEN SATURATION: 96 % | WEIGHT: 166 LBS

## 2025-06-20 DIAGNOSIS — R05.3 CHRONIC COUGH: Primary | ICD-10-CM

## 2025-06-20 PROCEDURE — 99204 OFFICE O/P NEW MOD 45 MIN: CPT | Performed by: INTERNAL MEDICINE

## 2025-06-20 RX ORDER — IPRATROPIUM BROMIDE 42 UG/1
1 SPRAY, METERED NASAL NIGHTLY
Qty: 1 EACH | Refills: 3 | Status: SHIPPED | OUTPATIENT
Start: 2025-06-20

## 2025-06-20 NOTE — PROGRESS NOTES
The following individual(s) verbally consented to be recorded using ambient AI listening technology and understand that they can each withdraw their consent to this listening technology at any point by asking the clinician to turn off or pause the recording:    Patient name: Jami Cross  Additional names:

## 2025-06-21 NOTE — PROGRESS NOTES
[facility] General Pulmonary Consult Note    Chief Complaint:  Chief Complaint   Patient presents with    New Patient     Ref'd from PCP. Pt states chronic cough that lasts for about 5 weeks and has been occurring for 2 years now.       History of Present Illness:  History of Present Illness  Jami Cross is a 60 year old female with a history of tracheostomy who presents with chronic cough. She was referred by Dr. Balderas for evaluation of her chronic cough.    She experiences a chronic dry cough that occurs every two months and lasts for approximately four to five weeks. The cough is hard and non-productive, with a sensation of a 'funny' feeling in her throat that triggers it. This pattern has been consistent since her tracheostomy in 2016 following surgery for C5, C6, and C7, which resulted in hematomas. Despite using inhalers such as fluticasone and albuterol, and treatments like Z-Chay and prednisone, the cough persists until it runs its course.    She feels extremely tired all the time, despite getting adequate sleep, and has undergone a sleep study. 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 No fevers have been noted, and her temperature has been normal. She is not feeling very well and is struggling with fatigue.    Her current medications include inhalers like fluticasone and albuterol, and she has previously used prednisone and Z-Chay for her cough. She also takes phentermine, which she suspects might be affecting her heart rate, as she sometimes experiences palpitations. No wheezing and normal lung sounds.      Past Medical History:   Past Medical History[1]     Past Surgical History: Past Surgical History[2]    Family Medical History: Family History[3]     Social History:   Social History     Socioeconomic History    Marital status:      Spouse name: Not on file    Number of children: Not on file    Years of education: Not  on file    Highest education level: Not on file   Occupational History    Not on file   Tobacco Use    Smoking status: Never    Smokeless tobacco: Never   Vaping Use    Vaping status: Never Used   Substance and Sexual Activity    Alcohol use: No    Drug use: No    Sexual activity: Yes   Other Topics Concern     Service Not Asked    Blood Transfusions Not Asked    Caffeine Concern No    Occupational Exposure Not Asked    Hobby Hazards Not Asked    Sleep Concern Not Asked    Stress Concern Not Asked    Weight Concern Not Asked    Special Diet Not Asked    Back Care Not Asked    Exercise Yes    Bike Helmet Not Asked    Seat Belt Yes    Self-Exams Not Asked   Social History Narrative    Not on file     Social Drivers of Health     Food Insecurity: Not on file   Transportation Needs: Not on file   Stress: Not on file   Housing Stability: Not on file        Allergies: Cellcept [mycophenolate]     Medications: Current Medications[4]    Review of Systems: Review of Systems    Physical Exam:  /86 (BP Location: Right arm, Patient Position: Sitting, Cuff Size: adult)   Pulse 100   Ht 5' (1.524 m)   Wt 166 lb (75.3 kg)   SpO2 96%   BMI 32.42 kg/m²      Constitutional: alert, cooperative. No acute distress.  HEENT: Head NC/AT. Nares normal. Septum midline. Mucosa normal. No drainage or sinus tenderness.. Mallampati 3+  Cardio: Regular rate and rhythm. Normal S1 and S2. No murmurs.   Respiratory: Thorax symmetrical with no labored breathing. clear to auscultation bilaterally  GI: NABS. Abd soft, non-tender.  Extremities: No clubbing or cyanosis. No BLE edema.    Neurologic: A&Ox3. No gross motor deficits.  Skin: Warm, dry  Psych: Calm, cooperative. Pleasant affect.    Results:  Images personally reviewed - my own review dictated as below  Results  DIAGNOSTIC  Sleep study: Snoring  WBC: 11.2, done on 3/6/2020.  HGB: 9.7, done on 3/6/2020.  PLT: 252, done on 3/6/2020.     Last eGFR was 72 on 5/23/2025.     XR  DEXA BONE DENSITOMETRY (CPT=77080)  Result Date: 6/5/2025  CONCLUSION:  Bone mineral density values for the lumbar spine are compatible with the diagnosis of osteopenia by WHO definition (T score between -1.0 and -2.5).  If therapy is initiated, follow-up study is recommended in 2 years for further evaluation of  therapeutic efficacy.   Recommendation:  The Bone Health and Osteoporosis Foundation (BHOF) recommends pharmacological treatment for patients with a FRAX 10-year risk score of 3% or higher for a hip fracture or 20% or higher for a major osteoporotic fracture, to prevent osteoporosis and reduce fracture risk. All treatment decisions require clinical judgment and consideration of individual patient factors, including patient preferences, comorbidities, previous drug use, risk fractures not captured in the FRAX model (e.g. frailty, falls, vitamin D deficiency, increased bone turnover, interval significant decline in bone density) and possible under- or over-estimation of fracture risk by FRAX. Additional information regarding the FRAX model can be found at the BHOF website: bonehealthandosteoporosis.org.  The World Health Organization has defined the following categories based on bone density: Normal bone:  T-score greater than or equal to -1 Osteopenia: T-score  less than -1 and greater  than -2.5 Osteoporosis:  T-score less than or equal -2.5   LOCATION:  [facility]     Dictated by (CST): Jm Fernandes MD on 6/05/2025 at 5:39 PM     Finalized by (CST): Jm Fernandes MD on 6/05/2025 at 5:39 PM       [facility] GENOVEVA 2D+3D SCREENING BILAT (CPT=77067/06520)  Result Date: 6/5/2025  CONCLUSION:   BI-RADS CATEGORY:  DIAGNOSTIC CATEGORY 1 - NEGATIVE ASSESSMENT.   RECOMMENDATIONS:  ROUTINE MAMMOGRAM AND CLINICAL EVALUATION IN 12 MONTHS.       A letter explaining the results in lay terms has been sent to the patient.  This exam was evaluated with a computer-aided device.  This patient's information has been entered into a reminder  system with a target due date for the next mammogram.   LOCATION:  [facility]   Dictated by (CST): Fracisco Chambers MD on 6/05/2025 at 4:00 PM     Finalized by (CST): Fracisco Chambers MD on 6/05/2025 at 4:02 PM       CT ABDOMEN+PELVIS(CONTRAST ONLY)(CPT=74177)  Result Date: 5/24/2025  CONCLUSION:  No CT evidence for acute inflammatory process in the abdomen or pelvis.  Large amount of stool throughout the colon consistent with constipation.  Postsurgical changes L5-S1 level.  3.5 cm right renal cyst.   LOCATION:  [id number]   Dictated by (CST): Amalia Ferrer MD on 5/24/2025 at 5:33 AM     Finalized by (CST): Amalia Ferrer MD on 5/24/2025 at 5:36 AM          Assessment/Plan:  Assessment & Plan  Chronic cough, intermittent  Chronic dry cough likely due to postnasal drip, persistent since tracheostomy in 2016. Previous treatments ineffective.  - Recommend navage nasal rinsing kit.  - Prescribe Atrovent nasal spray.  - Advise use of treatments when symptoms trigger.    Tracheostomy  Chronic cough present since tracheostomy in 2016.    Fatigue  Persistent fatigue despite adequate sleep. Possible contribution from stress and lack of caffeine. Awaiting sleep study results.    Tachycardia  Patient does not appear clinically ill, lung sounds are clear upper airway benign at present time  - Recommend following up with PCP for ongoing management    Follow-up  - Schedule follow-up in six weeks.        Return in about 6 weeks (around 8/1/2025).    Patsy Alcazar MD  6/20/2025         [1]   Past Medical History:   ANEMIA    Brachial neuritis or radiculitis NOS    HEADACHES    HYPERTENSION    IBS (irritable bowel syndrome)    Migraine, unspecified, without mention of intractable migraine without mention of status migrainosus    Osteoarthritis of left shoulder    Other and unspecified ovarian cyst    OTHER DISEASES    lupus    Other malaise and fatigue    Other psoriasis    Other specified disease of sebaceous glands    PONV  (postoperative nausea and vomiting)    Scleritis, unspecified    Tarsal tunnel syndrome    Tendonosis    Left shoulder    Visual impairment    glasses/contacts   [2]   Past Surgical History:  Procedure Laterality Date    Back surgery  3/23/16    C5-6-7    Colonoscopy  8/2009    Fluor gid & loclzj ndl/cath spi dx/ther njx N/A 10/14/2015    Procedure: CERVICAL EPIDURAL;  Surgeon: Timothy Garibay MD;  Location: [facility]    Fluor gid & loclzj ndl/cath spi dx/ther njx  11/11/2015    Procedure: ;  Surgeon: Timothy Garibay MD;  Location: [facility]    Injection, w/wo contrast, dx/therapeutic substance, epidural/subarachnoid; cervical/thoracic N/A 10/14/2015    Procedure: CERVICAL EPIDURAL;  Surgeon: Timothy Garibay MD;  Location: [facility]    Injection, w/wo contrast, dx/therapeutic substance, epidural/subarachnoid; cervical/thoracic N/A 11/11/2015    Procedure: CERVICAL EPIDURAL;  Surgeon: Timothy Garibay MD;  Location: [facility]    M-sedaj by  phys perfrmg sv 5+ yr N/A 10/14/2015    Procedure: CERVICAL EPIDURAL;  Surgeon: Timothy Garibay MD;  Location: [facility]    M-sedaj by  [place]fr[place] [facility] [place]  11/11/2015    Procedure: ;  Surgeon: Timothy Garibay MD;  Location: [facility]    Other      ovarian cystectomy    Other surgical history  2008    uterine ablation    Other surgical history  2007    fattty tumor removal, left flank    Tubal ligation     [3]   Family History  Problem Relation Age of Onset    Diabetes Mother     Hypertension Mother     Other (renal failure) Mother    [4]   Current Outpatient Medications   Medication Sig Dispense Refill    ipratropium 0.06 % Nasal Solution 1 spray by Nasal route nightly. 1 sprays in each nostril nightly 1 each 3    Phentermine HCl 37.5 MG Oral Cap Take 1 capsule (37.5 mg total) by mouth every morning. 30 capsule 3    triamcinolone 0.1 % External  Cream Apply 1 Application topically 2 (two) times daily as needed. Up to 2 weeks, then 1 week break before restarting this cycle as needed. 1 each 1    nystatin 100,000 Units/g External Cream Apply 1 Application topically 2 (two) times daily. 1 each 1    clobetasol 0.05 % External Solution Use twice weekly as needed. 50 mL 3    fluticasone-salmeterol 250-50 MCG/ACT Inhalation Aerosol Powder, Breath Activated Inhale 1 puff by mouth into the lungs in the morning and 1 puff in the evening. 60 each 0    albuterol 108 (90 Base) MCG/ACT Inhalation Aero Soln Inhale 2 puffs into the lungs every 4 (four) hours as needed for Shortness of Breath. 1 each 0    predniSONE 20 MG Oral Tab 30 mg x 2 days, then 20mg x 3 days, then 10mg x 2 days, then stop. 7 tablet 0    traMADol 50 MG Oral Tab Take 1 tablet (50 mg total) by mouth every 8 (eight) hours as needed for Pain. 60 tablet 0    omeprazole 20 MG Oral Capsule Delayed Release 30 min before meal.  Twice daily x 2 weeks, then once daily 90 capsule 1    fluticasone propionate 50 MCG/ACT Nasal Suspension 2 sprays by Each Nare route daily. 1 each 1    ergocalciferol 1.25 MG (99336 UT) Oral Cap Take 1 capsule (50,000 Units total) by mouth once a week. Once weekly x 20 weeks.  Take with food 20 capsule 0    ketoconazole 2 % External Shampoo APPLY 5ML TOPICALLY TWICE A WEEK 120 mL 0    ondansetron (ZOFRAN) 4 mg tablet Take 1 tablet po weekly for nausea after methotrexate, may repeat 1 tablet po every 8 hours      amLODIPine 10 MG Oral Tab Take 1 tablet (10 mg total) by mouth daily. 90 tablet 3    butalbital-acetaminophen-caffeine -40 MG Oral Tab Take 1 tablet by mouth every 6 (six) hours as needed for Pain. 20 tablet 0    gabapentin 300 MG Oral Cap Take 1 capsule (300 mg total) by mouth 4 (four) times daily. 360 capsule 1    folic acid 1 MG Oral Tab       methotrexate 2.5 MG Oral Tab Take 3 tablets (7.5 mg total) by mouth once a week.      naproxen 500 MG Oral Tab Take 1 tablet  (500 mg total) by mouth 2 (two) times daily.      tiZANidine 4 MG Oral Tab Take 1 tablet (4 mg total) by mouth nightly.      dexamethasone 0.1 % Ophthalmic Solution       guaiFENesin-codeine (CHERATUSSIN AC) 100-10 MG/5ML Oral Solution Take 5 mL by mouth every 6 (six) hours as needed for cough or congestion. 236 mL 0    hydroxychloroquine 200 MG Oral Tab

## 2025-07-12 DIAGNOSIS — J44.9 CHRONIC OBSTRUCTIVE PULMONARY DISEASE, UNSPECIFIED COPD TYPE (HCC): ICD-10-CM

## 2025-07-14 RX ORDER — FLUTICASONE PROPIONATE AND SALMETEROL 250; 50 UG/1; UG/1
POWDER RESPIRATORY (INHALATION)
Qty: 3 EACH | Refills: 1 | Status: SHIPPED | OUTPATIENT
Start: 2025-07-14

## 2025-07-16 ENCOUNTER — OFFICE VISIT (OUTPATIENT)
Facility: CLINIC | Age: 60
End: 2025-07-16
Payer: COMMERCIAL

## 2025-07-16 VITALS
RESPIRATION RATE: 16 BRPM | BODY MASS INDEX: 32.39 KG/M2 | HEIGHT: 60 IN | HEART RATE: 107 BPM | OXYGEN SATURATION: 97 % | SYSTOLIC BLOOD PRESSURE: 132 MMHG | WEIGHT: 165 LBS | DIASTOLIC BLOOD PRESSURE: 82 MMHG

## 2025-07-16 DIAGNOSIS — R05.3 CHRONIC COUGH: Primary | ICD-10-CM

## 2025-07-16 PROCEDURE — 99214 OFFICE O/P EST MOD 30 MIN: CPT | Performed by: INTERNAL MEDICINE

## 2025-07-16 RX ORDER — BUDESONIDE 0.5 MG/2ML
0.5 INHALANT ORAL DAILY
Qty: 120 ML | Refills: 5 | Status: SHIPPED | OUTPATIENT
Start: 2025-07-16

## 2025-07-16 NOTE — PROGRESS NOTES
[facility] Pulmonary Follow Up Note    Chief Complaint:  Chief Complaint   Patient presents with    Follow - Up     6 week f/u, cough getting bad.        History of Present Illness:  History of Present Illness  Jami Cross is a 60 year old female who presents with a persistent dry cough.    She has been experiencing a dry, non-productive cough for almost two weeks, primarily occurring in the middle of the night and waking her from sleep. The cough is described as 'not productive'.    She uses Atrovent nasal spray, Nuvessa, and Advair. Advair seems to suppress the cough temporarily, but it returns a few hours later, typically around 2 AM after going to bed at 10:30 PM. She has also tried lemon tea and cough drops without significant relief.    She experiences chest tightness and feels winded after coughing spells. No wheezing, fever, weight changes, or gastric reflux. She stopped using phentermine and is currently doing her own workout routine.    She has a history of coughing since 2016 following a tracheostomy. The cough episodes typically last about six to seven weeks before resolving, only to return two to three weeks later.    She works in IT and talks for a living, finding the cough particularly challenging in the context of heightened awareness about coughing post-2020.         Past Medical History:   Past Medical History[1]     Past Surgical History:   Past Surgical History[2]    Family Medical History: Family History[3]     Social History:   Social History     Socioeconomic History    Marital status:      Spouse name: Not on file    Number of children: Not on file    Years of education: Not on file    Highest education level: Not on file   Occupational History    Not on file   Tobacco Use    Smoking status: Never    Smokeless tobacco: Never   Vaping Use    Vaping status: Never Used   Substance and Sexual Activity    Alcohol use: No    Drug use: No    Sexual activity: Yes   Other Topics Concern      Service Not Asked    Blood Transfusions Not Asked    Caffeine Concern No    Occupational Exposure Not Asked    Hobby Hazards Not Asked    Sleep Concern Not Asked    Stress Concern Not Asked    Weight Concern Not Asked    Special Diet Not Asked    Back Care Not Asked    Exercise Yes    Bike Helmet Not Asked    Seat Belt Yes    Self-Exams Not Asked   Social History Narrative    Not on file     Social Drivers of Health     Food Insecurity: Not on file   Transportation Needs: Not on file   Stress: Not on file   Housing Stability: Not on file        Medications: Current Medications[4]    Review of Systems: Review of Systems     Physical Exam:  /82   Pulse 107   Resp 16   Ht 5' (1.524 m)   Wt 165 lb (74.8 kg)   SpO2 97%   BMI 32.22 kg/m²      Constitutional: alert, cooperative. No acute distress.  HEENT: Head NC/AT. Nares normal. Septum midline. Mucosa normal. No drainage or sinus tenderness.  Cardio: Regular rate and rhythm. Normal S1 and S2. No murmurs.   Respiratory: Thorax symmetrical with no labored breathing. clear to auscultation bilaterally  Extremities: No clubbing or cyanosis. No BLE edema.    Neurologic: A&Ox3. No gross motor deficits.  Skin: Warm, dry  Psych: Calm, cooperative. Pleasant affect.    Results:  Images personally reviewed - reading is my own personal review  Results         PFTs:       No data to display                   No data to display                    WBC: 11.2, done on 3/6/2020.  HGB: 9.7, done on 3/6/2020.  PLT: 252, done on 3/6/2020.     Last eGFR was 72 on 5/23/2025.     XR DEXA BONE DENSITOMETRY (CPT=77080)  Result Date: 6/5/2025  CONCLUSION:  Bone mineral density values for the lumbar spine are compatible with the diagnosis of osteopenia by WHO definition (T score between -1.0 and -2.5).  If therapy is initiated, follow-up study is recommended in 2 years for further evaluation of  therapeutic efficacy.   Recommendation:  The Bone Health and Osteoporosis Foundation  (OF) recommends pharmacological treatment for patients with a FRAX 10-year risk score of 3% or higher for a hip fracture or 20% or higher for a major osteoporotic fracture, to prevent osteoporosis and reduce fracture risk. All treatment decisions require clinical judgment and consideration of individual patient factors, including patient preferences, comorbidities, previous drug use, risk fractures not captured in the FRAX model (e.g. frailty, falls, vitamin D deficiency, increased bone turnover, interval significant decline in bone density) and possible under- or over-estimation of fracture risk by FRAX. Additional information regarding the FRAX model can be found at the OF website: bonehealthandosteoporosis.org.  The World Health Organization has defined the following categories based on bone density: Normal bone:  T-score greater than or equal to -1 Osteopenia: T-score  less than -1 and greater  than -2.5 Osteoporosis:  T-score less than or equal -2.5   LOCATION:  [facility]     Dictated by (CST): Jm Fernandes MD on 6/05/2025 at 5:39 PM     Finalized by (CST): Jm Fernandes MD on 6/05/2025 at 5:39 PM       [facility] GENOVEVA 2D+3D SCREENING BILAT (CPT=77067/55752)  Result Date: 6/5/2025  CONCLUSION:   BI-RADS CATEGORY:  DIAGNOSTIC CATEGORY 1 - NEGATIVE ASSESSMENT.   RECOMMENDATIONS:  ROUTINE MAMMOGRAM AND CLINICAL EVALUATION IN 12 MONTHS.       A letter explaining the results in lay terms has been sent to the patient.  This exam was evaluated with a computer-aided device.  This patient's information has been entered into a reminder system with a target due date for the next mammogram.   LOCATION:  [facility]   Dictated by (CST): Fracisco Chambers MD on 6/05/2025 at 4:00 PM     Finalized by (CST): Fracisco Chambers MD on 6/05/2025 at 4:02 PM       CT ABDOMEN+PELVIS(CONTRAST ONLY)(CPT=74177)  Result Date: 5/24/2025  CONCLUSION:  No CT evidence for acute inflammatory process in the abdomen or pelvis.  Large amount of stool throughout the colon  consistent with constipation.  Postsurgical changes L5-S1 level.  3.5 cm right renal cyst.   LOCATION:  [id number]   Dictated by (CST): Amalia Ferrer MD on 5/24/2025 at 5:33 AM     Finalized by (CST): Amalia Ferrer MD on 5/24/2025 at 5:36 AM          Assessment/Plan:  Assessment & Plan  Chronic cough  Chronic dry cough, primarily nocturnal, with intermittent chest tightness and dyspnea. Partial relief from Advair suggests pulmonary etiology. Differential includes postnasal drip and tracheostomy-related anatomical changes.  - Prescribed nebulizer with budesonide for improved relief.  - Consider imaging if symptoms persist despite nebulizer treatment.         Return in about 4 weeks (around 8/13/2025).    I spent 35 minutes obtaining and reviewing records, preparing for the visit including reviewing chart and prior testing, face to face time examining the patient and obtaining history, counseling, arranging and reviewing office-based testing, independently reviewing relevant studies and documentation exclusive of other billable procedures.      Patsy Alcazar MD  7/16/2025         [1]   Past Medical History:   ANEMIA    Brachial neuritis or radiculitis NOS    HEADACHES    HYPERTENSION    IBS (irritable bowel syndrome)    Migraine, unspecified, without mention of intractable migraine without mention of status migrainosus    Osteoarthritis of left shoulder    Other and unspecified ovarian cyst    OTHER DISEASES    lupus    Other malaise and fatigue    Other psoriasis    Other specified disease of sebaceous glands    PONV (postoperative nausea and vomiting)    Scleritis, unspecified    Tarsal tunnel syndrome    Tendonosis    Left shoulder    Visual impairment    glasses/contacts   [2]   Past Surgical History:  Procedure Laterality Date    Back surgery  3/23/16    C5-6-7    Colonoscopy  8/2009    Fluor gid & loclzj ndl/cath spi dx/ther njx N/A 10/14/2015    Procedure: CERVICAL EPIDURAL;  Surgeon: Timothy Garibay,  MD;  Location: [facility]    Fluor gid & loclzj ndl/cath spi dx/ther njx  11/11/2015    Procedure: ;  Surgeon: Timothy Garibay MD;  Location: [facility]    Injection, w/wo contrast, dx/therapeutic substance, epidural/subarachnoid; cervical/thoracic N/A 10/14/2015    Procedure: CERVICAL EPIDURAL;  Surgeon: Timothy Garibay MD;  Location: [facility]    Injection, w/wo contrast, dx/therapeutic substance, epidural/subarachnoid; cervical/thoracic N/A 11/11/2015    Procedure: CERVICAL EPIDURAL;  Surgeon: Timothy Garibay MD;  Location: [facility]    M-sedaj by sm phys perfrmg svc 5+ yr N/A 10/14/2015    Procedure: CERVICAL EPIDURAL;  Surgeon: Timothy Garibay MD;  Location: [facility]    M-sedaj by sm phys perfrmg svc 5+ yr  11/11/2015    Procedure: ;  Surgeon: Timothy Garibay MD;  Location: [facility]    Other      ovarian cystectomy    Other surgical history  2008    uterine ablation    Other surgical history  2007    fattty tumor removal, left flank    Tubal ligation     [3]   Family History  Problem Relation Age of Onset    Diabetes Mother     Hypertension Mother     Other (renal failure) Mother    [4]   Current Outpatient Medications   Medication Sig Dispense Refill    budesonide 0.5 MG/2ML Inhalation Suspension Take 2 mL (0.5 mg total) by nebulization daily. 120 mL 5    Respiratory Therapy Supplies (FULL KIT NEBULIZER SET) Does not apply Misc 1 kit As Directed. 1 each 0    fluticasone-salmeterol 250-50 MCG/ACT Inhalation Aerosol Powder, Breath Activated Inhale 1 puff by mouth into the lungs in the morning and 1 puff in the evening. 3 each 1    ipratropium 0.06 % Nasal Solution 1 spray by Nasal route nightly. 1 sprays in each nostril nightly 1 each 3    Phentermine HCl 37.5 MG Oral Cap Take 1 capsule (37.5 mg total) by mouth every morning. 30 capsule 3    triamcinolone 0.1 % External Cream Apply 1  Application topically 2 (two) times daily as needed. Up to 2 weeks, then 1 week break before restarting this cycle as needed. 1 each 1    nystatin 100,000 Units/g External Cream Apply 1 Application topically 2 (two) times daily. 1 each 1    clobetasol 0.05 % External Solution Use twice weekly as needed. 50 mL 3    albuterol 108 (90 Base) MCG/ACT Inhalation Aero Soln Inhale 2 puffs into the lungs every 4 (four) hours as needed for Shortness of Breath. 1 each 0    predniSONE 20 MG Oral Tab 30 mg x 2 days, then 20mg x 3 days, then 10mg x 2 days, then stop. 7 tablet 0    traMADol 50 MG Oral Tab Take 1 tablet (50 mg total) by mouth every 8 (eight) hours as needed for Pain. 60 tablet 0    omeprazole 20 MG Oral Capsule Delayed Release 30 min before meal.  Twice daily x 2 weeks, then once daily 90 capsule 1    fluticasone propionate 50 MCG/ACT Nasal Suspension 2 sprays by Each Nare route daily. 1 each 1    ergocalciferol 1.25 MG (51235 UT) Oral Cap Take 1 capsule (50,000 Units total) by mouth once a week. Once weekly x 20 weeks.  Take with food 20 capsule 0    ketoconazole 2 % External Shampoo APPLY 5ML TOPICALLY TWICE A WEEK 120 mL 0    ondansetron (ZOFRAN) 4 mg tablet Take 1 tablet po weekly for nausea after methotrexate, may repeat 1 tablet po every 8 hours      amLODIPine 10 MG Oral Tab Take 1 tablet (10 mg total) by mouth daily. 90 tablet 3    butalbital-acetaminophen-caffeine -40 MG Oral Tab Take 1 tablet by mouth every 6 (six) hours as needed for Pain. 20 tablet 0    gabapentin 300 MG Oral Cap Take 1 capsule (300 mg total) by mouth 4 (four) times daily. 360 capsule 1    folic acid 1 MG Oral Tab       methotrexate 2.5 MG Oral Tab Take 3 tablets (7.5 mg total) by mouth once a week.      naproxen 500 MG Oral Tab Take 1 tablet (500 mg total) by mouth 2 (two) times daily.      tiZANidine 4 MG Oral Tab Take 1 tablet (4 mg total) by mouth nightly.      dexamethasone 0.1 % Ophthalmic Solution       guaiFENesin-codeine  (CHERATUSSIN AC) 100-10 MG/5ML Oral Solution Take 5 mL by mouth every 6 (six) hours as needed for cough or congestion. 236 mL 0    hydroxychloroquine 200 MG Oral Tab

## 2025-07-16 NOTE — PROGRESS NOTES
The following individual(s) verbally consented to be recorded using ambient AI listening technology and understand that they can each withdraw their consent to this listening technology at any point by asking the clinician to turn off or pause the recording:    Patient name: Jami Cross  Additional names:

## (undated) DIAGNOSIS — L30.9 DERMATITIS: ICD-10-CM

## (undated) DEVICE — WIRE FX PRCPT KRSH BVL BLNT

## (undated) DEVICE — FLOSEAL HEMOSTATIC MATRIX, 5ML: Brand: FLOSEAL HEMOSTATIC MATRIX

## (undated) DEVICE — NV CLIP DSC&IN-LINE ACTIVATOR

## (undated) DEVICE — RELINE POWER

## (undated) DEVICE — GRAFT DELIVERY SYS MODULE

## (undated) DEVICE — SOL  .9 1000ML BTL

## (undated) DEVICE — TRANSPOSAL ULTRAFLEX DUO/QUAD ULTRA CART MANIFOLD

## (undated) DEVICE — DRAPE TABLE COVER 44X90 TC-10

## (undated) DEVICE — SUTURE VICRYL 2-0 FSL

## (undated) DEVICE — DRILL SRG OIL CRTDG MAESTRO

## (undated) DEVICE — NUVAMAP O.R. TECHNOLOGY

## (undated) DEVICE — COVER,MAYO STAND,STERILE: Brand: MEDLINE

## (undated) DEVICE — Device

## (undated) DEVICE — STERILE POLYISOPRENE POWDER-FREE SURGICAL GLOVES: Brand: PROTEXIS

## (undated) DEVICE — LIGHT HANDLE

## (undated) DEVICE — WRAP COOLING BACK W/NO PILLOW

## (undated) DEVICE — 11.1-M5 MULTIMODALITY KIT 5

## (undated) DEVICE — UNDYED BRAIDED (POLYGLACTIN 910), SYNTHETIC ABSORBABLE SUTURE: Brand: COATED VICRYL

## (undated) DEVICE — DRAPE C-ARM UNIVERSAL

## (undated) DEVICE — LAMINECTOMY CDS: Brand: MEDLINE INDUSTRIES, INC.

## (undated) DEVICE — BLDE PRCPT FSCL SPLTR DISP

## (undated) DEVICE — MAXCESS MAS TLIF 2 KIT ACCESS

## (undated) DEVICE — C-ARMOR C-ARM EQUIPMENT COVERS CLEAR STERILE UNIVERSAL FIT 12 PER CASE: Brand: C-ARMOR

## (undated) DEVICE — SUTURE VICRYL 1 OS-6

## (undated) DEVICE — NV I-PAS III DIAMOND TIP

## (undated) DEVICE — 3.0MM PRECISION ROUND

## (undated) DEVICE — 3M™ MICROFOAM™ TAPE 1528-4: Brand: 3M™ MICROFOAM™

## (undated) DEVICE — KENDALL SCD EXPRESS SLEEVES, KNEE LENGTH, MEDIUM: Brand: KENDALL SCD

## (undated) NOTE — ED AVS SNAPSHOT
THE [facility] Emergency Department in [address] [facility]    Phone:  [phone]    Fax:  [phone]           Estiven Avelar   MRN: [id number]    Department:  THE [facility] Emergency Department in [place]   Date of Visit: Bring a paper prescription for each of these medications    - azithromycin 250 MG Tabs  - predniSONE 20 MG Tabs            Discharge References/Attachments     BRONCHITIS, ANTIOBIOTIC TREATMENT (ADULT) (ENGLISH)      Disclosure     Insurance plans vary an IF THERE IS ANY CHANGE OR WORSENING OF YOUR CONDITION, CALL YOUR PRIMARY CARE PHYSICIAN AT ONCE OR RETURN IMMEDIATELY TO THE EMERGENCY DEPARTMENT.     If you have been prescribed any medication(s), please fill your prescription right away and begin taking t [address] to help you get signed up for insurance coverage. [address] is a Federal Navigator program that can help with your Affordable Care Act coverage, as well as all types of Medicaid plans.   To get signed up and covere

## (undated) NOTE — LETTER
06/01/21        Jami Cross  [facility] [address]      Dear Shelly Mckinnon,    Our records indicate that you have outstanding lab work and or testing that was ordered for you and has not yet been completed:  Orders Placed This Encounter

## (undated) NOTE — LETTER
21    Patient: Kris Majano  : 3/17/1965 Visit date: 2021    Dear  Colletta Silos, MD    Thank you for referring Kris Majano to my practice. Please find my assessment and plan below.      Breast: The patient is examined in the sitting an noted.  Follow-up annual screening is recommended. On physical examination, the patient does have a palpable left axillary lymph node which is mobile, mildly tender to palpation.   No abnormalities are noted on breast examination    Patient does have a h

## (undated) NOTE — IP AVS SNAPSHOT
Patient Demographics     Address  [address] [address] Phone  [phone] (Home)  [phone] (Mobile) *Preferred* E-mail Address  [email or web address]      Emergency Contact(s)     Name Relation Home Work Mobile    [facility] ? Adhere to sitting restrictions. Stairs  ? Climb stairs as needed      IF you have a Brace / Corset  ? Use when out of bed except when showering. ? May wear even when toileting. ?  Anticipate wearing for 6-12 weeks after surgery; exact time to be dete ? Drink six to eight glasses liquid (water, juice) per day. ? Eat a high fiber diet (bran, vegetables, fruit). ?  Use an over the counter stool softener such as Colace or senakot while taking narcotics to prevent constipation; use laxatives such as Mirala ? Schedule one week follow up with Primary Care Physician. Review all medications. When to contact your surgeon  ? Temp > 101F; Take your temperature twice a day  ? Increased pain, swelling, redness, or any drainage to incision.   ? Separation of incis Commonly known as:  MICROZIDE      Take 1 capsule (12.5 mg total) by mouth daily.    Marcel Kaur DO         HYDROcodone-acetaminophen  MG Tabs  Commonly known as:  Norco  Notes to patient:  Last dose was taken today at 10am      Take 1 tablet by (Or Linked Group #1) 03/07/20 1515 Given      [id number] HYDROcodone-acetaminophen (NORCO)  MG per tab 1 tablet 03/07/20 2215 Given      [id number] HYDROcodone-acetaminophen (NORCO)  MG per tab 1 tablet 03/08/20 0417 Given      [id number] HYDROcod decompression and interbody fusion. Gilberto Vyas PA-C  HPI:  Here for pre-operative evaluation requested by Dr. Christine Byrd. Will have Lumbar 5- Sacral 1 Decompression Fusion  at [facility] on 3/5/2020. Radicular pain into left leg to foot. .     PAST MEDICAL HIS • hydrochlorothiazide 12.5 MG Oral Cap Take 1 capsule (12.5 mg total) by mouth daily.  90 capsule 0   • TRAMADOL HCL 50 MG Oral Tab TAKE 1 TABLET BY MOUTH EVERY 6 HOURS AS NEEDED FOR PAIN 90 tablet 0   • MUPIROCIN CALCIUM 2 % External Cream APPLY 1 APPLICAT (Patient not taking: Reported on 2/20/2020 ) 30 tablet 5   • Losartan Potassium-HCTZ 100-12.5 MG Oral Tab Take 1 tablet by mouth daily.  90 tablet 3      ALLERGIES: Cellcept [Mycophenolate]  Social History:   Social History       Socioeconomic History Seat Belt: Not Asked        Self-Exams: Not Asked    Social History Narrative      Not on file         REVIEW OF SYSTEMS:  GENERAL: Feeling generally well. EYES: Denies diplopia or blurred vision. EARS: Denies tinnitus or decreased hearing acuity. rales. No dullness to percussion. ABDOMEN: Soft, nontender, nondistended, NABS. No HSM; no masses; no bruits. LYMPHATIC: no lymphadenopathy palpated about the anterior and posterior cervical chains, submandibular, supraclavicular, and inguinal areas.   [facility] 3SW-A Attending Daniel Tracy MD   Hosp Day # 0 PCP David Vick MD     Reason for consult: Medical management    Requested by: Dr. Elena Ervin    History of Present Illness: Ashely Webber is a 62year old female with past medical • OTHER      ovarian cystectomy   • OTHER SURGICAL HISTORY  2008    uterine ablation   • OTHER SURGICAL HISTORY  2007    fattty tumor removal, left flank   • TUBAL LIGATION         Social History:  reports that she has never smoked.  She has never used smok CLOBETASOL PROPIONATE 0.05 % External Solution, APPLY 5 ML TOPICALLY 2 (TWO) TIMES DAILY. , Disp: 50 mL, Rfl: 2  Losartan Potassium-HCTZ 100-12.5 MG Oral Tab, Take 1 tablet by mouth daily. , Disp: 90 tablet, Rfl: 3  Ketoconazole 2 % External Shampoo, Apply 5 No results for input(s): GLU, BUN, CREATSERUM, GFRAA, GFRNAA, CA, ALB, NA, K, CL, CO2, ALKPHO, AST, ALT, BILT, TP in the last 168 hours. No results for input(s): PTP, INR in the last 168 hours.     No results for input(s): TROP, CK in the last 168 hour • HYPERTENSION    • IBS (irritable bowel syndrome)    • Migraine, unspecified, without mention of intractable migraine without mention of status migrainosus    • Osteoarthritis of left shoulder 2/6/2019   • Other and unspecified ovarian cyst    • OTHER DIS BALANCE[TF.1]                                                                                                                     Static Sitting: Good  Dynamic Sitting: Good           Static [address]  Dynamic Standing: Fair -[TF.2]    ACTIVITY Elizabeth Sherwood transfers. Felt 'hot and slightly nauseated ' after the car transfer simulation training that she has to be brought back to her room with w/c. Assist with restroom use and placed back on the recliner.  Instructed to walk several times with staff while in [address] rolling at assistance level: supervision      Goal #4 Pt will negotiate 4 steps with one railing with supervision. Goal #5 Pt will recall and follow 3/3 spine precautions during activity.    Goal #6     Goal Comments: Goals established on 3/6/2020 mention of status migrainosus    • Osteoarthritis of left shoulder 2/6/2019   • Other and unspecified ovarian cyst    • OTHER DISEASES     lupus   • Other malaise and fatigue    • Other psoriasis    • Other specified disease of sebaceous glands    • PONV ( Management Techniques: Activity promotion; Body mechanics;Repositioning;Breathing techniques[MD.2]    COGNITION  · Overall Cognitive Status:  WFL - within functional limits    RANGE OF MOTION AND STRENGTH ASSESSMENT  Upper extremity ROM and strength are wit Skilled Therapy Provided: Pt presents seated in chair, reports 10/10 pain but willing to try and work with PT. Chair back brace in place. Pt instructed in spine precautions, recalls 3/3 without difficulty.  Sit to stand with min assist and cueing for breath These impairments and comorbidities manifest themselves as functional limitations in independent bed mobility, transfers, gait and stair negotiation.   The patient is below baseline and would benefit from skilled inpatient PT to address the above deficits t Number of Visits to Meet Established Goals: 2    Presenting Problem: back pain[MR.2]    History related to current admission: Pt is 63 yo female admitted from home for L5-S1 decompression fusion completed 3/5/20.  Pt with history of lupus, HTN, migraines, h Performed by Wesley Veronica MD at [address] POST LAT INTERBODY FUS 1 LEVEL N/A 3/5/2020    Performed by Jeff Gomez MD at [facility] MAIN OR   • OTHER      ovarian cystectomy   • OTHER SURGICAL HISTORY  2008 u bel, recommend 3:1 for over toilet at home)  grooming (hand and oral hygiene in stand at sink with mod ind),   UB dress (seated, mod ind)  Brace management (mod ind in seated)  LB Dress (don/doff socks seated in figure 4 no AE with mod ind, donned pan Patient will transfer from sit to stand:  with supervision Met 3/7  Patient will transfer form stand to sit:  with supervision Met 3/7     UE Exercise Program Goal  Patient will be independent with bilateral AROM HEP (home exercise program).   -D/C 3/8 as p • Other malaise and fatigue    • Other psoriasis    • Other specified disease of sebaceous glands    • PONV (postoperative nausea and vomiting)    • Scleritis, unspecified    • Tarsal tunnel syndrome    • Tendonosis 2/6/2019    Left shoulder   • Visual imp -   Bathing (including washing, rinsing, drying)?: A Little  -   Toileting, which includes using toilet, bedpan or urinal? : A Little  -   Putting on and taking off regular upper body clothing?: None  -   Taking care of personal grooming such as brushing t OT services during hospital stay to further address these deficits and assist patient in returning to prior level of function. OT Discharge Recommendations: Home; Intermittent Supervision  OT Device Recommendations: Reacher;Sock aid    PLAN  OT Treatme Reason for Therapy: ADL/IADL Dysfunction and Discharge Planning    History related to current admission:  Pt is 63 yo female admitted from home for L5-S1 decompression fusion completed 3/5/20.  Pt with history of lupus, HTN, migraines, history of Cervical s • LUMBAR LAMINECTOMY POST LAT INTERBODY FUS 1 LEVEL N/A 3/5/2020    Performed by Miladys Gerber MD at [address] [place] [address]   • OTHER      ovarian cystectomy   • OTHER SURGICAL HISTORY  2008    uterine ablation   • OTHER SURGICAL HISTORY  2007    fattty tumor remova Neurological Findings: None                ACTIVITY TOLERANCE                         O2 SATURATIONS                ACTIVITIES OF DAILY LIVING ASSESSMENT  AM-PAC ‘6-Clicks’ Inpatient Daily Activity Short Form  How much help from another person does the pat and concerns addressed;SCDs in place    ASSESSMENT     Patient is a 62year old female admitted on 3/5/2020 for L5-S1 decompression fusion completed 3/5/20. Complete medical history and occupational profile noted above.  Functional outcome measures complete OT Treatment Plan: Balance activities; Energy conservation/work simplification techniques;ADL training;IADL training;Functional transfer training;UE strengthening/ROM; Endurance training;Cognitive reorientation;Patient/Family education;Patient/Family trainin Fall prevention and spine clearance for running   - See additional Care Plan goals for specific interventions    Patient/Family Short Term Goal     Interdisciplinary Progressing    Description:  Patient's Short Term Goal:  \"absolutely go home tomorrow\"

## (undated) NOTE — LETTER
12/01/20        Jami Cross  [facility] [address]      Dear Da Martin,    Our records indicate that you have outstanding lab work and or testing that was ordered for you and has not yet been completed:  Orders Placed This Encounter      L

## (undated) NOTE — Clinical Note
Procedure Date: 3/5/20D/c Date:3/8/20Procedure:Lumbar 5- Sacral 1 Decompression Fusion Surgeon: Carmita Padilla

## (undated) NOTE — LETTER
Date: 2/3/2020    Patient Name: Ary Cisneros          To Whom it may concern: This letter has been written at the patient's request. The above patient was seen at the [facility] for treatment of a medical condition.     This patient shoul

## (undated) NOTE — LETTER
[facility] Testing Department  Phone: [phone]  Right Fax: [phone]  [facility] 20 Michelle Villagomez RN Date: 20    Patient Name: Brody Pringle  Surgery Date: 3/5/2020    CSN: [id number]  Medical Record: [id number]

## (undated) NOTE — MR AVS SNAPSHOT
[address]  [address] [address] [phone]               Thank you for choosing us for your health care visit with Jose Lopez MD.  We are glad to serve you and happy to provide you with this No Known Allergies                Today's Vital Signs     BP Pulse Temp Weight          124/86 mmHg 95 98.3 °F (36.8 °C) (Oral) 168 lb 12.8 oz           Current Medications          This list is accurate as of: 3/16/17  5:02 PM.  Always use your most rece Summaries. If you've been to the Emergency Department or your doctor's office, you can view your past visit information in [facility] by going to Visits < Visit Summaries. [facility] questions? Call [phone] for help.   [facility] is NOT to be used for urge

## (undated) NOTE — LETTER
[facility] Testing Department  Phone: [phone]  Right Fax: [phone]  [facility] 20 By:  Peter Billings RN Date: 20    Patient Name: Janessa Cedeno  Surgery Date: 3/5/2020    CSN: [id number]  Medical Record: [id number]

## (undated) NOTE — LETTER
07/12/22      RE: Na Jaramillo          To Whom It May Concern:          Please excuse Jami Cross from work 7/12/22-7/13/22. She was seen and treated by me today.         Feel free to call with any questions,                    [id number] [address]

## (undated) NOTE — MR AVS SNAPSHOT
[address]  [address] [facility], [address] [address] [phone]               Thank you for choosing us for your health care visit with Anand Barrera MD.  We are glad to serve you and happy to provide you with this Take 1 tablet by mouth 2 (two) times daily. Commonly known as:  PLAQUENIL           Ketoconazole 2 % Sham   Apply 5 mL topically twice a week.    Commonly known as:  NIZORAL   Start taking on:  2/16/2017           Labetalol HCl 100 MG Tabs   TAKE 1 TABLET call [facility] at [phone]. Tuesday February 14, 2017     Imaging:  XR DEXA BONE DENSITOMETRY (CPT=77080)    Instructions:   To schedule an appointment for your radiology test please call [facility] Scheduling

## (undated) NOTE — ED AVS SNAPSHOT
THE [facility] Emergency Department in [address] [facility]    Phone:  [phone]    Fax:  [phone]           Thaddeus Pittman   MRN: [id number]    Department:  THE [facility] Emergency Department in [place]   Date of Visit: IF THERE IS ANY CHANGE OR WORSENING OF YOUR CONDITION, CALL YOUR PRIMARY CARE PHYSICIAN AT ONCE OR RETURN IMMEDIATELY TO THE EMERGENCY DEPARTMENT.     If you have been prescribed any medication(s), please fill your prescription right away and begin taking t

## (undated) NOTE — LETTER
12/20/21        Jami Crsos  [facility] [address]      Dear Katy Bullard,    Our records indicate that you have outstanding lab work and or testing that was ordered for you and has not yet been completed:  Orders Placed This Encounter      U